# Patient Record
Sex: FEMALE | Race: WHITE | NOT HISPANIC OR LATINO | Employment: FULL TIME | URBAN - METROPOLITAN AREA
[De-identification: names, ages, dates, MRNs, and addresses within clinical notes are randomized per-mention and may not be internally consistent; named-entity substitution may affect disease eponyms.]

---

## 2017-01-10 LAB
HBV SURFACE AG SERPL QL IA: NEGATIVE
RUBELLA ANTIBODY IGG QUANTITATIVE: NORMAL IU/ML

## 2017-06-09 LAB — GROUP B STREP PCR: NEGATIVE

## 2017-07-16 ENCOUNTER — HOSPITAL ENCOUNTER (INPATIENT)
Facility: CLINIC | Age: 32
LOS: 4 days | Discharge: HOME | End: 2017-07-20
Attending: OBSTETRICS & GYNECOLOGY | Admitting: OBSTETRICS & GYNECOLOGY
Payer: COMMERCIAL

## 2017-07-16 DIAGNOSIS — Z98.891 S/P C-SECTION: Primary | ICD-10-CM

## 2017-07-16 PROCEDURE — 25000132 ZZH RX MED GY IP 250 OP 250 PS 637: Performed by: OBSTETRICS & GYNECOLOGY

## 2017-07-16 PROCEDURE — S0191 MISOPROSTOL, ORAL, 200 MCG: HCPCS | Performed by: OBSTETRICS & GYNECOLOGY

## 2017-07-16 PROCEDURE — 10907ZC DRAINAGE OF AMNIOTIC FLUID, THERAPEUTIC FROM PRODUCTS OF CONCEPTION, VIA NATURAL OR ARTIFICIAL OPENING: ICD-10-PCS | Performed by: REGISTERED NURSE

## 2017-07-16 PROCEDURE — 12000029 ZZH R&B OB INTERMEDIATE

## 2017-07-16 RX ORDER — MISOPROSTOL 100 UG/1
25 TABLET ORAL
Status: DISCONTINUED | OUTPATIENT
Start: 2017-07-16 | End: 2017-07-17

## 2017-07-16 RX ORDER — FENTANYL CITRATE 50 UG/ML
50-100 INJECTION, SOLUTION INTRAMUSCULAR; INTRAVENOUS
Status: DISCONTINUED | OUTPATIENT
Start: 2017-07-16 | End: 2017-07-17

## 2017-07-16 RX ORDER — ZOLPIDEM TARTRATE 5 MG/1
5 TABLET ORAL
Status: DISCONTINUED | OUTPATIENT
Start: 2017-07-16 | End: 2017-07-17

## 2017-07-16 RX ORDER — NALOXONE HYDROCHLORIDE 0.4 MG/ML
.1-.4 INJECTION, SOLUTION INTRAMUSCULAR; INTRAVENOUS; SUBCUTANEOUS
Status: DISCONTINUED | OUTPATIENT
Start: 2017-07-16 | End: 2017-07-17

## 2017-07-16 RX ADMIN — Medication 25 MCG: at 20:37

## 2017-07-16 RX ADMIN — Medication 25 MCG: at 22:31

## 2017-07-16 RX ADMIN — ZOLPIDEM TARTRATE 5 MG: 5 TABLET, FILM COATED ORAL at 22:31

## 2017-07-16 NOTE — IP AVS SNAPSHOT
MRN:[id number]                      After Visit Summary   2017    Leti Tapia    MRN: [id number]           Thank you!     Thank you for choosing [facility] for your care. Our goal is always to provide you with excellent care. Hearing back from our patients is one way we can continue to improve our services. Please take a few minutes to complete the written survey that you may receive in the mail after you visit with us. Thank you!        Patient Information     Date Of Birth          1985        About your hospital stay     You were admitted on:  2017 You last received care in the:  [address]    You were discharged on:  2017       Who to Call     For medical emergencies, please call 911.  For non-urgent questions about your medical care, please call your primary care provider or clinic, None  For questions related to your surgery, please call your surgery clinic        Attending Provider     Provider Specialty    Katy Redd MD OB/Gyn    Aislinn Layton APRN CNM [facility]    Janae Covarrubias MD OB/Gyn       Primary Care Provider    Physician No Ref-Primary      After Care Instructions     Activity       Review discharge instructions            Diet       Resume previous diet            Discharge Instructions - Postpartum visit       Schedule postpartum visit with your provider and return to clinic in 6 weeks.                  Further instructions from your care team       Postop  Birth Instructions    Activity       Do not lift more than 10 pounds for 6 weeks after surgery.  Ask family and friends for help when you need it.    No driving until you have stopped taking your pain medications (usually two weeks after surgery).    No heavy exercise or activity for 6 weeks.  Don't do anything that will put a strain on your surgery site.    Don't strain when using the toilet.  Your care team may  prescribe a stool softener if you have problems with your bowel movements.     To care for your incision:       Keep the incision clean and dry.    Do not soak your incision in water. No swimming or hot tubs until it has fully healed. You may soak in the bathtub if the water level is below your incision.    Do not use peroxide, gel, cream, lotion, or ointment on your incision.    Adjust your clothes to avoid pressure on your surgery site (check the elastic in your underwear for example).     You may see a small amount of clear or pink drainage and this is normal.  Check with your health care provider:       If the drainage increases or has an odor.    If the incision reddens, you have swelling, or develop a rash.    If you have increased pain and the medicine we prescribed doesn't help.    If you have a fever above 100.4 F (38 C) with or without chills when placing thermometer under your tongue.   The area around your incision (surgery wound), will feel numb.  This is normal. The numbness should go away in less than a year.     Keep your hands clean:  Always wash your hands before touching your incision (surgery wound). This helps reduce your risk of infection. If your hands aren't dirty, you may use an alcohol hand-rub to clean your hands. Keep your nails clean and short.    Call your healthcare provider if you have any of these symptoms:       You soak a sanitary pad with blood within 1 hour, or you see blood clots larger than a golf ball.    Bleeding that lasts more than 6 weeks.    Vaginal discharge that smells bad.    Severe pain, cramping or tenderness in your lower belly area.    A need to urinate more frequently (use the toilet more often), more urgently (use the toilet very quickly), or it burns when you urinate.    Nausea and vomiting.    Redness, swelling or pain around a vein in your leg.    Problems breastfeeding or a red or painful area on your breast.    Chest pain and cough or are gasping for  "air.    Problems with coping with sadness, anxiety or depression. If you have concerns about hurting yourself or the baby, call your provider immediately.      You have questions or concerns after you return home.                  Pending Results     No orders found from 2017 to 2017.            Statement of Approval     Ordered          17 0738  I have reviewed and agree with all the recommendations and orders detailed in this document.  EFFECTIVE NOW     Approved and electronically signed by:  Nohemy Augustine CNM             Admission Information     Date & Time Provider Department Dept. Phone    2017 Janae Covarrubias MD [address] [phone]      Your Vitals Were     Blood Pressure Pulse Temperature Respirations Height Weight    116/73 92 98.4  F (36.9  C) (Oral) 16 1.6 m (5' 3\") 74.8 kg (165 lb)    Pulse Oximetry BMI (Body Mass Index)                98% 29.23 kg/m2          [facility] Information     [employer] lets you send messages to your doctor, view your test results, renew your prescriptions, schedule appointments and more. To sign up, go to [email or web address] . Click on \"Log in\" on the left side of the screen, which will take you to the Welcome page. Then click on \"Sign up Now\" on the right side of the page.     You will be asked to enter the access code listed below, as well as some personal information. Please follow the directions to create your username and password.     Your access code is: [email or web address]  Expires: 10/18/2017  8:48 AM     Your access code will  in 90 days. If you need help or a new code, please call your [facility] clinic or [phone].        Care EveryWhere ID     This is your Care EveryWhere ID. This could be used by other organizations to access your [facility] medical records  [id number]        Equal Access to Services     CARLA REYES AH: Beth Chacon, connor hall, shannon sullivan " sari aguila brian renteria'aan ah. So [facility] [phone].    ATENCIÓN: Si tierrala britt, tiene a gee disposición servicios gratuitos de asistencia lingüística. Rosalee al [phone].    We comply with applicable federal civil rights laws and [place] laws. We do not discriminate on the basis of race, color, national origin, age, disability sex, sexual orientation or gender identity.               Review of your medicines      START taking        Dose / Directions    ibuprofen 600 MG tablet   Commonly known as:  ADVIL/MOTRIN        Dose:  600 mg   Take 1 tablet (600 mg) by mouth every 6 hours as needed for moderate pain   Quantity:  120 tablet   Refills:  1       oxyCODONE 5 MG IR tablet   Commonly known as:  ROXICODONE        Dose:  5-10 mg   Take 1-2 tablets (5-10 mg) by mouth every 4 hours as needed for moderate to severe pain   Quantity:  30 tablet   Refills:  0            Where to get your medicines      Some of these will need a paper prescription and others can be bought over the counter. Ask your nurse if you have questions.     Bring a paper prescription for each of these medications     ibuprofen 600 MG tablet    oxyCODONE 5 MG IR tablet                Protect others around you: Learn how to safely use, store and throw away your medicines at www.disposemymeds.org.             Medication List: This is a list of all your medications and when to take them. Check marks below indicate your daily home schedule. Keep this list as a reference.      Medications           Morning Afternoon Evening Bedtime As Needed    ibuprofen 600 MG tablet   Commonly known as:  ADVIL/MOTRIN   Take 1 tablet (600 mg) by mouth every 6 hours as needed for moderate pain   Last time this was given:  600 mg on 7/20/2017  7:18 AM                                oxyCODONE 5 MG IR tablet   Commonly known as:  ROXICODONE   Take 1-2 tablets (5-10 mg) by mouth every 4 hours as needed for moderate to severe pain   Last time this  was given:  5 mg on 7/20/2017  7:18 AM

## 2017-07-16 NOTE — IP AVS SNAPSHOT
[address]    [place] [address]    Phone:  [phone]                                       After Visit Summary   7/16/2017    Leti Tapia    MRN: [id number]           After Visit Summary Signature Page     I have received my discharge instructions, and my questions have been answered. I have discussed any challenges I see with this plan with the nurse or doctor.    ..........................................................................................................................................  Patient/Patient Representative Signature      ..........................................................................................................................................  Patient Representative Print Name and Relationship to Patient    ..................................................               ................................................  Date                                            Time    ..........................................................................................................................................  Reviewed by Signature/Title    ...................................................              ..............................................  Date                                                            Time

## 2017-07-17 ENCOUNTER — ANESTHESIA EVENT (OUTPATIENT)
Dept: OBGYN | Facility: CLINIC | Age: 32
End: 2017-07-17
Payer: COMMERCIAL

## 2017-07-17 ENCOUNTER — ANESTHESIA (OUTPATIENT)
Dept: OBGYN | Facility: CLINIC | Age: 32
End: 2017-07-17
Payer: COMMERCIAL

## 2017-07-17 PROBLEM — Z98.891 S/P C-SECTION: Status: ACTIVE | Noted: 2017-07-17

## 2017-07-17 LAB
ABO + RH BLD: NORMAL
BLD GP AB SCN SERPL QL: NORMAL
BLD GP AB SCN SERPL QL: NORMAL
BLOOD BANK CMNT PATIENT-IMP: NORMAL
HGB BLD-MCNC: 11.5 G/DL (ref 11.7–15.7)
SPECIMEN EXP DATE BLD: NORMAL

## 2017-07-17 PROCEDURE — 86900 BLOOD TYPING SEROLOGIC ABO: CPT | Performed by: REGISTERED NURSE

## 2017-07-17 PROCEDURE — 25000128 H RX IP 250 OP 636: Performed by: NURSE ANESTHETIST, CERTIFIED REGISTERED

## 2017-07-17 PROCEDURE — 86901 BLOOD TYPING SEROLOGIC RH(D): CPT | Performed by: REGISTERED NURSE

## 2017-07-17 PROCEDURE — 25000125 ZZHC RX 250: Performed by: NURSE ANESTHETIST, CERTIFIED REGISTERED

## 2017-07-17 PROCEDURE — 25000128 H RX IP 250 OP 636: Performed by: ANESTHESIOLOGY

## 2017-07-17 PROCEDURE — 37000011 ZZH ANESTHESIA WARD SERVICE

## 2017-07-17 PROCEDURE — 37000008 ZZH ANESTHESIA TECHNICAL FEE, 1ST 30 MIN: Performed by: OBSTETRICS & GYNECOLOGY

## 2017-07-17 PROCEDURE — C1765 ADHESION BARRIER: HCPCS | Performed by: OBSTETRICS & GYNECOLOGY

## 2017-07-17 PROCEDURE — 36000056 ZZH SURGERY LEVEL 3 1ST 30 MIN: Performed by: OBSTETRICS & GYNECOLOGY

## 2017-07-17 PROCEDURE — 37000009 ZZH ANESTHESIA TECHNICAL FEE, EACH ADDTL 15 MIN: Performed by: OBSTETRICS & GYNECOLOGY

## 2017-07-17 PROCEDURE — 36415 COLL VENOUS BLD VENIPUNCTURE: CPT | Performed by: OBSTETRICS & GYNECOLOGY

## 2017-07-17 PROCEDURE — 86900 BLOOD TYPING SEROLOGIC ABO: CPT | Performed by: OBSTETRICS & GYNECOLOGY

## 2017-07-17 PROCEDURE — 86901 BLOOD TYPING SEROLOGIC RH(D): CPT | Performed by: OBSTETRICS & GYNECOLOGY

## 2017-07-17 PROCEDURE — 12000029 ZZH R&B OB INTERMEDIATE

## 2017-07-17 PROCEDURE — 25000128 H RX IP 250 OP 636: Performed by: REGISTERED NURSE

## 2017-07-17 PROCEDURE — 25000134 H RX MED IP 250 OP 636 PS 250: Performed by: NURSE ANESTHETIST, CERTIFIED REGISTERED

## 2017-07-17 PROCEDURE — 85018 HEMOGLOBIN: CPT | Performed by: REGISTERED NURSE

## 2017-07-17 PROCEDURE — 27210794 ZZH OR GENERAL SUPPLY STERILE: Performed by: OBSTETRICS & GYNECOLOGY

## 2017-07-17 PROCEDURE — 86780 TREPONEMA PALLIDUM: CPT | Performed by: OBSTETRICS & GYNECOLOGY

## 2017-07-17 PROCEDURE — 25000132 ZZH RX MED GY IP 250 OP 250 PS 637: Performed by: OBSTETRICS & GYNECOLOGY

## 2017-07-17 PROCEDURE — 86850 RBC ANTIBODY SCREEN: CPT | Performed by: OBSTETRICS & GYNECOLOGY

## 2017-07-17 PROCEDURE — 36000058 ZZH SURGERY LEVEL 3 EA 15 ADDTL MIN: Performed by: OBSTETRICS & GYNECOLOGY

## 2017-07-17 PROCEDURE — 00HU33Z INSERTION OF INFUSION DEVICE INTO SPINAL CANAL, PERCUTANEOUS APPROACH: ICD-10-PCS | Performed by: ANESTHESIOLOGY

## 2017-07-17 PROCEDURE — 3E0R3CZ INTRODUCTION OF REGIONAL ANESTHETIC INTO SPINAL CANAL, PERCUTANEOUS APPROACH: ICD-10-PCS | Performed by: ANESTHESIOLOGY

## 2017-07-17 PROCEDURE — 25000128 H RX IP 250 OP 636: Performed by: OBSTETRICS & GYNECOLOGY

## 2017-07-17 PROCEDURE — 86850 RBC ANTIBODY SCREEN: CPT | Performed by: REGISTERED NURSE

## 2017-07-17 PROCEDURE — 71000012 ZZH RECOVERY PHASE 1 LEVEL 1 FIRST HR: Performed by: OBSTETRICS & GYNECOLOGY

## 2017-07-17 PROCEDURE — 25000125 ZZHC RX 250: Performed by: REGISTERED NURSE

## 2017-07-17 PROCEDURE — S0191 MISOPROSTOL, ORAL, 200 MCG: HCPCS | Performed by: OBSTETRICS & GYNECOLOGY

## 2017-07-17 PROCEDURE — 25000125 ZZHC RX 250: Performed by: OBSTETRICS & GYNECOLOGY

## 2017-07-17 PROCEDURE — 25000132 ZZH RX MED GY IP 250 OP 250 PS 637: Performed by: REGISTERED NURSE

## 2017-07-17 RX ORDER — ONDANSETRON 2 MG/ML
4 INJECTION INTRAMUSCULAR; INTRAVENOUS EVERY 30 MIN PRN
Status: DISCONTINUED | OUTPATIENT
Start: 2017-07-17 | End: 2017-07-18 | Stop reason: HOSPADM

## 2017-07-17 RX ORDER — ACETAMINOPHEN 325 MG/1
650 TABLET ORAL EVERY 4 HOURS PRN
Status: DISCONTINUED | OUTPATIENT
Start: 2017-07-17 | End: 2017-07-17

## 2017-07-17 RX ORDER — NALOXONE HYDROCHLORIDE 0.4 MG/ML
.1-.4 INJECTION, SOLUTION INTRAMUSCULAR; INTRAVENOUS; SUBCUTANEOUS
Status: DISCONTINUED | OUTPATIENT
Start: 2017-07-17 | End: 2017-07-17

## 2017-07-17 RX ORDER — OXYTOCIN/0.9 % SODIUM CHLORIDE 30/500 ML
1-24 PLASTIC BAG, INJECTION (ML) INTRAVENOUS CONTINUOUS
Status: DISCONTINUED | OUTPATIENT
Start: 2017-07-17 | End: 2017-07-17

## 2017-07-17 RX ORDER — NALOXONE HYDROCHLORIDE 0.4 MG/ML
.1-.4 INJECTION, SOLUTION INTRAMUSCULAR; INTRAVENOUS; SUBCUTANEOUS
Status: DISCONTINUED | OUTPATIENT
Start: 2017-07-17 | End: 2017-07-18 | Stop reason: HOSPADM

## 2017-07-17 RX ORDER — EPHEDRINE SULFATE 50 MG/ML
5 INJECTION, SOLUTION INTRAMUSCULAR; INTRAVENOUS; SUBCUTANEOUS
Status: DISCONTINUED | OUTPATIENT
Start: 2017-07-17 | End: 2017-07-17

## 2017-07-17 RX ORDER — ONDANSETRON 2 MG/ML
4 INJECTION INTRAMUSCULAR; INTRAVENOUS EVERY 6 HOURS PRN
Status: DISCONTINUED | OUTPATIENT
Start: 2017-07-17 | End: 2017-07-17

## 2017-07-17 RX ORDER — ACETAMINOPHEN 325 MG/1
975 TABLET ORAL EVERY 8 HOURS
Status: DISCONTINUED | OUTPATIENT
Start: 2017-07-17 | End: 2017-07-20 | Stop reason: HOSPADM

## 2017-07-17 RX ORDER — SIMETHICONE 80 MG
80 TABLET,CHEWABLE ORAL 4 TIMES DAILY PRN
Status: DISCONTINUED | OUTPATIENT
Start: 2017-07-17 | End: 2017-07-20 | Stop reason: HOSPADM

## 2017-07-17 RX ORDER — ROPIVACAINE HYDROCHLORIDE 2 MG/ML
10 INJECTION, SOLUTION EPIDURAL; INFILTRATION; PERINEURAL ONCE
Status: COMPLETED | OUTPATIENT
Start: 2017-07-17 | End: 2017-07-17

## 2017-07-17 RX ORDER — CITRIC ACID/SODIUM CITRATE 334-500MG
30 SOLUTION, ORAL ORAL
Status: COMPLETED | OUTPATIENT
Start: 2017-07-17 | End: 2017-07-17

## 2017-07-17 RX ORDER — HYDROMORPHONE HYDROCHLORIDE 1 MG/ML
.3-.5 INJECTION, SOLUTION INTRAMUSCULAR; INTRAVENOUS; SUBCUTANEOUS EVERY 30 MIN PRN
Status: DISCONTINUED | OUTPATIENT
Start: 2017-07-17 | End: 2017-07-19 | Stop reason: CLARIF

## 2017-07-17 RX ORDER — FENTANYL CITRATE 50 UG/ML
INJECTION, SOLUTION INTRAMUSCULAR; INTRAVENOUS PRN
Status: DISCONTINUED | OUTPATIENT
Start: 2017-07-17 | End: 2017-07-17

## 2017-07-17 RX ORDER — BISACODYL 10 MG
10 SUPPOSITORY, RECTAL RECTAL DAILY PRN
Status: DISCONTINUED | OUTPATIENT
Start: 2017-07-19 | End: 2017-07-20 | Stop reason: HOSPADM

## 2017-07-17 RX ORDER — ONDANSETRON 4 MG/1
4 TABLET, ORALLY DISINTEGRATING ORAL EVERY 6 HOURS PRN
Status: DISCONTINUED | OUTPATIENT
Start: 2017-07-17 | End: 2017-07-17

## 2017-07-17 RX ORDER — CHLOROPROCAINE HYDROCHLORIDE 30 MG/ML
INJECTION, SOLUTION EPIDURAL; INFILTRATION; INTRACAUDAL; PERINEURAL PRN
Status: DISCONTINUED | OUTPATIENT
Start: 2017-07-17 | End: 2017-07-17

## 2017-07-17 RX ORDER — NALOXONE HYDROCHLORIDE 0.4 MG/ML
.1-.4 INJECTION, SOLUTION INTRAMUSCULAR; INTRAVENOUS; SUBCUTANEOUS
Status: DISCONTINUED | OUTPATIENT
Start: 2017-07-17 | End: 2017-07-19 | Stop reason: CLARIF

## 2017-07-17 RX ORDER — MORPHINE SULFATE 1 MG/ML
INJECTION, SOLUTION EPIDURAL; INTRATHECAL; INTRAVENOUS
Status: DISCONTINUED
Start: 2017-07-17 | End: 2017-07-17 | Stop reason: HOSPADM

## 2017-07-17 RX ORDER — OXYTOCIN 10 [USP'U]/ML
10 INJECTION, SOLUTION INTRAMUSCULAR; INTRAVENOUS
Status: DISCONTINUED | OUTPATIENT
Start: 2017-07-17 | End: 2017-07-20 | Stop reason: HOSPADM

## 2017-07-17 RX ORDER — CEFAZOLIN SODIUM 1 G/3ML
1 INJECTION, POWDER, FOR SOLUTION INTRAMUSCULAR; INTRAVENOUS
Status: DISCONTINUED | OUTPATIENT
Start: 2017-07-17 | End: 2017-07-17

## 2017-07-17 RX ORDER — CEFAZOLIN SODIUM 2 G/100ML
2 INJECTION, SOLUTION INTRAVENOUS
Status: COMPLETED | OUTPATIENT
Start: 2017-07-17 | End: 2017-07-17

## 2017-07-17 RX ORDER — LIDOCAINE 40 MG/G
CREAM TOPICAL
Status: DISCONTINUED | OUTPATIENT
Start: 2017-07-17 | End: 2017-07-17

## 2017-07-17 RX ORDER — IBUPROFEN 800 MG/1
800 TABLET, FILM COATED ORAL
Status: DISCONTINUED | OUTPATIENT
Start: 2017-07-17 | End: 2017-07-17

## 2017-07-17 RX ORDER — LIDOCAINE 40 MG/G
CREAM TOPICAL
Status: DISCONTINUED | OUTPATIENT
Start: 2017-07-17 | End: 2017-07-18 | Stop reason: HOSPADM

## 2017-07-17 RX ORDER — KETOROLAC TROMETHAMINE 30 MG/ML
30 INJECTION, SOLUTION INTRAMUSCULAR; INTRAVENOUS EVERY 6 HOURS
Status: DISCONTINUED | OUTPATIENT
Start: 2017-07-17 | End: 2017-07-17

## 2017-07-17 RX ORDER — DIPHENHYDRAMINE HYDROCHLORIDE 50 MG/ML
25 INJECTION INTRAMUSCULAR; INTRAVENOUS EVERY 6 HOURS PRN
Status: DISCONTINUED | OUTPATIENT
Start: 2017-07-17 | End: 2017-07-19 | Stop reason: CLARIF

## 2017-07-17 RX ORDER — AMOXICILLIN 250 MG
1-2 CAPSULE ORAL 2 TIMES DAILY
Status: DISCONTINUED | OUTPATIENT
Start: 2017-07-17 | End: 2017-07-20 | Stop reason: HOSPADM

## 2017-07-17 RX ORDER — SODIUM CHLORIDE, SODIUM LACTATE, POTASSIUM CHLORIDE, CALCIUM CHLORIDE 600; 310; 30; 20 MG/100ML; MG/100ML; MG/100ML; MG/100ML
INJECTION, SOLUTION INTRAVENOUS CONTINUOUS
Status: DISCONTINUED | OUTPATIENT
Start: 2017-07-17 | End: 2017-07-17

## 2017-07-17 RX ORDER — HYDROCORTISONE 2.5 %
CREAM (GRAM) TOPICAL 3 TIMES DAILY PRN
Status: DISCONTINUED | OUTPATIENT
Start: 2017-07-17 | End: 2017-07-20 | Stop reason: HOSPADM

## 2017-07-17 RX ORDER — MORPHINE SULFATE 1 MG/ML
INJECTION, SOLUTION EPIDURAL; INTRATHECAL; INTRAVENOUS PRN
Status: DISCONTINUED | OUTPATIENT
Start: 2017-07-17 | End: 2017-07-17

## 2017-07-17 RX ORDER — SODIUM CHLORIDE, SODIUM LACTATE, POTASSIUM CHLORIDE, CALCIUM CHLORIDE 600; 310; 30; 20 MG/100ML; MG/100ML; MG/100ML; MG/100ML
INJECTION, SOLUTION INTRAVENOUS CONTINUOUS
Status: DISCONTINUED | OUTPATIENT
Start: 2017-07-17 | End: 2017-07-18 | Stop reason: HOSPADM

## 2017-07-17 RX ORDER — OXYTOCIN/0.9 % SODIUM CHLORIDE 30/500 ML
PLASTIC BAG, INJECTION (ML) INTRAVENOUS
Status: DISCONTINUED
Start: 2017-07-17 | End: 2017-07-17 | Stop reason: HOSPADM

## 2017-07-17 RX ORDER — LIDOCAINE 40 MG/G
CREAM TOPICAL
Status: DISCONTINUED | OUTPATIENT
Start: 2017-07-17 | End: 2017-07-20 | Stop reason: HOSPADM

## 2017-07-17 RX ORDER — OXYCODONE HYDROCHLORIDE 5 MG/1
5-10 TABLET ORAL
Status: DISCONTINUED | OUTPATIENT
Start: 2017-07-17 | End: 2017-07-20 | Stop reason: HOSPADM

## 2017-07-17 RX ORDER — OXYTOCIN/0.9 % SODIUM CHLORIDE 30/500 ML
340 PLASTIC BAG, INJECTION (ML) INTRAVENOUS CONTINUOUS PRN
Status: DISCONTINUED | OUTPATIENT
Start: 2017-07-17 | End: 2017-07-20 | Stop reason: HOSPADM

## 2017-07-17 RX ORDER — EPHEDRINE SULFATE 50 MG/ML
5 INJECTION, SOLUTION INTRAMUSCULAR; INTRAVENOUS; SUBCUTANEOUS
Status: DISCONTINUED | OUTPATIENT
Start: 2017-07-17 | End: 2017-07-18 | Stop reason: HOSPADM

## 2017-07-17 RX ORDER — LANOLIN 100 %
OINTMENT (GRAM) TOPICAL
Status: DISCONTINUED | OUTPATIENT
Start: 2017-07-17 | End: 2017-07-20 | Stop reason: HOSPADM

## 2017-07-17 RX ORDER — OXYCODONE AND ACETAMINOPHEN 5; 325 MG/1; MG/1
1 TABLET ORAL
Status: DISCONTINUED | OUTPATIENT
Start: 2017-07-17 | End: 2017-07-17

## 2017-07-17 RX ORDER — OXYTOCIN 10 [USP'U]/ML
10 INJECTION, SOLUTION INTRAMUSCULAR; INTRAVENOUS
Status: DISCONTINUED | OUTPATIENT
Start: 2017-07-17 | End: 2017-07-17

## 2017-07-17 RX ORDER — MORPHINE SULFATE 1 MG/ML
3 INJECTION, SOLUTION EPIDURAL; INTRATHECAL; INTRAVENOUS ONCE
Status: DISCONTINUED | OUTPATIENT
Start: 2017-07-17 | End: 2017-07-18 | Stop reason: HOSPADM

## 2017-07-17 RX ORDER — METHYLERGONOVINE MALEATE 0.2 MG/ML
200 INJECTION INTRAVENOUS
Status: DISCONTINUED | OUTPATIENT
Start: 2017-07-17 | End: 2017-07-17

## 2017-07-17 RX ORDER — NALBUPHINE HYDROCHLORIDE 10 MG/ML
2.5-5 INJECTION, SOLUTION INTRAMUSCULAR; INTRAVENOUS; SUBCUTANEOUS EVERY 6 HOURS PRN
Status: DISCONTINUED | OUTPATIENT
Start: 2017-07-17 | End: 2017-07-18 | Stop reason: HOSPADM

## 2017-07-17 RX ORDER — OXYTOCIN/0.9 % SODIUM CHLORIDE 30/500 ML
100 PLASTIC BAG, INJECTION (ML) INTRAVENOUS CONTINUOUS
Status: DISCONTINUED | OUTPATIENT
Start: 2017-07-17 | End: 2017-07-18 | Stop reason: CLARIF

## 2017-07-17 RX ORDER — FENTANYL CITRATE 50 UG/ML
INJECTION, SOLUTION INTRAMUSCULAR; INTRAVENOUS
Status: DISCONTINUED
Start: 2017-07-17 | End: 2017-07-17 | Stop reason: HOSPADM

## 2017-07-17 RX ORDER — OXYTOCIN/0.9 % SODIUM CHLORIDE 30/500 ML
100-340 PLASTIC BAG, INJECTION (ML) INTRAVENOUS CONTINUOUS PRN
Status: COMPLETED | OUTPATIENT
Start: 2017-07-17 | End: 2017-07-17

## 2017-07-17 RX ORDER — NALBUPHINE HYDROCHLORIDE 10 MG/ML
2.5-5 INJECTION, SOLUTION INTRAMUSCULAR; INTRAVENOUS; SUBCUTANEOUS EVERY 6 HOURS PRN
Status: DISCONTINUED | OUTPATIENT
Start: 2017-07-17 | End: 2017-07-17

## 2017-07-17 RX ORDER — CARBOPROST TROMETHAMINE 250 UG/ML
250 INJECTION, SOLUTION INTRAMUSCULAR
Status: DISCONTINUED | OUTPATIENT
Start: 2017-07-17 | End: 2017-07-17

## 2017-07-17 RX ORDER — DEXTROSE, SODIUM CHLORIDE, SODIUM LACTATE, POTASSIUM CHLORIDE, AND CALCIUM CHLORIDE 5; .6; .31; .03; .02 G/100ML; G/100ML; G/100ML; G/100ML; G/100ML
INJECTION, SOLUTION INTRAVENOUS CONTINUOUS
Status: DISCONTINUED | OUTPATIENT
Start: 2017-07-17 | End: 2017-07-18 | Stop reason: CLARIF

## 2017-07-17 RX ORDER — MISOPROSTOL 200 UG/1
400 TABLET ORAL
Status: DISCONTINUED | OUTPATIENT
Start: 2017-07-17 | End: 2017-07-20 | Stop reason: HOSPADM

## 2017-07-17 RX ORDER — CHLOROPROCAINE HYDROCHLORIDE 30 MG/ML
INJECTION, SOLUTION EPIDURAL; INFILTRATION; INTRACAUDAL; PERINEURAL
Status: DISCONTINUED
Start: 2017-07-17 | End: 2017-07-17 | Stop reason: HOSPADM

## 2017-07-17 RX ORDER — ONDANSETRON 2 MG/ML
4 INJECTION INTRAMUSCULAR; INTRAVENOUS EVERY 6 HOURS PRN
Status: DISCONTINUED | OUTPATIENT
Start: 2017-07-17 | End: 2017-07-20 | Stop reason: HOSPADM

## 2017-07-17 RX ORDER — DIPHENHYDRAMINE HCL 25 MG
25 CAPSULE ORAL EVERY 6 HOURS PRN
Status: DISCONTINUED | OUTPATIENT
Start: 2017-07-17 | End: 2017-07-20 | Stop reason: HOSPADM

## 2017-07-17 RX ORDER — FENTANYL CITRATE 50 UG/ML
25-50 INJECTION, SOLUTION INTRAMUSCULAR; INTRAVENOUS
Status: DISCONTINUED | OUTPATIENT
Start: 2017-07-17 | End: 2017-07-18 | Stop reason: HOSPADM

## 2017-07-17 RX ORDER — ONDANSETRON 4 MG/1
4 TABLET, ORALLY DISINTEGRATING ORAL EVERY 30 MIN PRN
Status: DISCONTINUED | OUTPATIENT
Start: 2017-07-17 | End: 2017-07-18 | Stop reason: HOSPADM

## 2017-07-17 RX ORDER — KETOROLAC TROMETHAMINE 30 MG/ML
30 INJECTION, SOLUTION INTRAMUSCULAR; INTRAVENOUS EVERY 6 HOURS
Status: COMPLETED | OUTPATIENT
Start: 2017-07-18 | End: 2017-07-19

## 2017-07-17 RX ADMIN — PHENYLEPHRINE HYDROCHLORIDE 100 MCG: 10 INJECTION, SOLUTION INTRAMUSCULAR; INTRAVENOUS; SUBCUTANEOUS at 22:51

## 2017-07-17 RX ADMIN — PHENYLEPHRINE HYDROCHLORIDE 100 MCG: 10 INJECTION, SOLUTION INTRAMUSCULAR; INTRAVENOUS; SUBCUTANEOUS at 22:20

## 2017-07-17 RX ADMIN — CHLOROPROCAINE HYDROCHLORIDE 30 ML: 30 INJECTION, SOLUTION EPIDURAL; INFILTRATION at 22:12

## 2017-07-17 RX ADMIN — PHENYLEPHRINE HYDROCHLORIDE 100 MCG: 10 INJECTION, SOLUTION INTRAMUSCULAR; INTRAVENOUS; SUBCUTANEOUS at 22:22

## 2017-07-17 RX ADMIN — SODIUM CHLORIDE, POTASSIUM CHLORIDE, SODIUM LACTATE AND CALCIUM CHLORIDE: 600; 310; 30; 20 INJECTION, SOLUTION INTRAVENOUS at 22:19

## 2017-07-17 RX ADMIN — SODIUM CHLORIDE, POTASSIUM CHLORIDE, SODIUM LACTATE AND CALCIUM CHLORIDE: 600; 310; 30; 20 INJECTION, SOLUTION INTRAVENOUS at 12:40

## 2017-07-17 RX ADMIN — CEFAZOLIN SODIUM 2 G: 2 INJECTION, SOLUTION INTRAVENOUS at 22:20

## 2017-07-17 RX ADMIN — FENTANYL CITRATE 100 MCG: 50 INJECTION, SOLUTION INTRAMUSCULAR; INTRAVENOUS at 11:47

## 2017-07-17 RX ADMIN — Medication 12 ML/HR: at 13:22

## 2017-07-17 RX ADMIN — SODIUM CHLORIDE, POTASSIUM CHLORIDE, SODIUM LACTATE AND CALCIUM CHLORIDE: 600; 310; 30; 20 INJECTION, SOLUTION INTRAVENOUS at 20:27

## 2017-07-17 RX ADMIN — ROPIVACAINE HYDROCHLORIDE 10 ML: 2 INJECTION, SOLUTION EPIDURAL; INFILTRATION at 13:08

## 2017-07-17 RX ADMIN — SODIUM CHLORIDE, POTASSIUM CHLORIDE, SODIUM LACTATE AND CALCIUM CHLORIDE: 600; 310; 30; 20 INJECTION, SOLUTION INTRAVENOUS at 14:11

## 2017-07-17 RX ADMIN — PHENYLEPHRINE HYDROCHLORIDE 200 MCG: 10 INJECTION, SOLUTION INTRAMUSCULAR; INTRAVENOUS; SUBCUTANEOUS at 22:36

## 2017-07-17 RX ADMIN — Medication 25 MCG: at 04:40

## 2017-07-17 RX ADMIN — Medication 25 MCG: at 02:48

## 2017-07-17 RX ADMIN — Medication 340 ML/HR: at 22:35

## 2017-07-17 RX ADMIN — Medication 1 MILLI-UNITS/MIN: at 16:16

## 2017-07-17 RX ADMIN — PHENYLEPHRINE HYDROCHLORIDE 200 MCG: 10 INJECTION, SOLUTION INTRAMUSCULAR; INTRAVENOUS; SUBCUTANEOUS at 22:56

## 2017-07-17 RX ADMIN — PHENYLEPHRINE HYDROCHLORIDE 100 MCG: 10 INJECTION, SOLUTION INTRAMUSCULAR; INTRAVENOUS; SUBCUTANEOUS at 22:17

## 2017-07-17 RX ADMIN — FENTANYL CITRATE 50 MCG: 50 INJECTION, SOLUTION INTRAMUSCULAR; INTRAVENOUS at 23:11

## 2017-07-17 RX ADMIN — FENTANYL CITRATE 50 MCG: 50 INJECTION, SOLUTION INTRAMUSCULAR; INTRAVENOUS at 23:09

## 2017-07-17 RX ADMIN — SODIUM CHLORIDE, POTASSIUM CHLORIDE, SODIUM LACTATE AND CALCIUM CHLORIDE: 600; 310; 30; 20 INJECTION, SOLUTION INTRAVENOUS at 11:43

## 2017-07-17 RX ADMIN — MORPHINE SULFATE 3 MG: 1 INJECTION EPIDURAL; INTRATHECAL; INTRAVENOUS at 22:12

## 2017-07-17 RX ADMIN — SODIUM CITRATE AND CITRIC ACID MONOHYDRATE 30 ML: 500; 334 SOLUTION ORAL at 21:56

## 2017-07-17 RX ADMIN — PHENYLEPHRINE HYDROCHLORIDE 100 MCG: 10 INJECTION, SOLUTION INTRAMUSCULAR; INTRAVENOUS; SUBCUTANEOUS at 22:45

## 2017-07-17 RX ADMIN — Medication 25 MCG: at 00:32

## 2017-07-17 RX ADMIN — PHENYLEPHRINE HYDROCHLORIDE 100 MCG: 10 INJECTION, SOLUTION INTRAMUSCULAR; INTRAVENOUS; SUBCUTANEOUS at 22:13

## 2017-07-17 RX ADMIN — PHENYLEPHRINE HYDROCHLORIDE 100 MCG: 10 INJECTION, SOLUTION INTRAMUSCULAR; INTRAVENOUS; SUBCUTANEOUS at 23:04

## 2017-07-17 RX ADMIN — PHENYLEPHRINE HYDROCHLORIDE 100 MCG: 10 INJECTION, SOLUTION INTRAMUSCULAR; INTRAVENOUS; SUBCUTANEOUS at 22:27

## 2017-07-17 ASSESSMENT — LIFESTYLE VARIABLES: TOBACCO_USE: 1

## 2017-07-17 NOTE — ANESTHESIA PREPROCEDURE EVALUATION
Anesthesia Plan      History & Physical Review      ASA Status:  2 .             Postoperative Care      Consents                          .

## 2017-07-17 NOTE — H&P
"[facility] Labor and Delivery History and Physical    Leti Tapia MRN# [id number]   Age: 32 year old YOB: 1985     Date of Admission:  2017    Primary care provider: No Ref-Primary, Physician           Chief Complaint:   Leti Tapia is a 32 year old  who is 41w2d pregnant and was admitted yesterday evening for IOL for postdates. Oral cytotec x 5 doses was administered over the last 12 hours. The last dose was at 0400.           Pregnancy history:     OBSTETRIC HISTORY:  CURRENT PREGNANCY: Pt was in [place] the month she conceived. Zika screening was negative. Otherwise normal prenatal course. EFW: 7#12oz. GBS negative. Rh pos.     Obstetric History       T0      L0     SAB0   TAB0   Ectopic0   Multiple0   Live Births0       # Outcome Date GA Lbr Jovany/2nd Weight Sex Delivery Anes PTL Lv   1 Current                   EDC: Estimated Date of Delivery: 2017    Prenatal Labs:   Lab Results   Component Value Date    ABO O 10/20/2014    RH  Pos 10/20/2014    AS Neg 10/20/2014    HEPBANG negative 01/10/2017    HGB 13.4 10/20/2014       GBS Status:   Lab Results   Component Value Date    GBS negative 2017       Active Problem List  Patient Active Problem List   Diagnosis     Vulvar hematoma     Post-op pain     Allergic rhinitis, unspecified allergic rhinitis type     Indication for care in labor or delivery       Medication Prior to Admission  No prescriptions prior to admission.   .        Maternal Past Medical History:   History reviewed. No pertinent past medical history.                       Social History:   I have reviewed this patient's social history          Physical Exam:   Vitals were reviewed  Blood pressure 121/75, pulse 83, temperature 98.7  F (37.1  C), temperature source Temporal, resp. rate 16, height 1.6 m (5' 3\"), weight 74.8 kg (165 lb), not currently breastfeeding.  Constitutional:   awake, alert, cooperative, no apparent " distress, and appears stated age      Cervix:   Membranes: intact   Dilation: 1   Effacement: 80%   Station:-2   Consistency: soft   Position: Anterior  Presentation:Cephalic  Fetal Heart Rate Tracing: Category I  Tocometer: frequency q 1-3 minutes                       Assessment:   Leti Tapia is a 41w2d pregnant female admitted with induction of labor, indication post-dates.          Plan:   Intermittent monitoring for now. Pt encouraged to ambulate or get on ball. If same ctx pattern with no change by 1000, will AROM. If ctxs space out, will start Pitocin.     BLAISE BrumfieldM

## 2017-07-17 NOTE — PLAN OF CARE
1930-pt arrived on unit, ambulatory with . Transferred to room 213. Oriented pt to room and call light. Applied monitors. VSS. Discussed plan of care with pt and .   1950-notified Dr Redd, orders received.  2020- piv placed  2030-SVE 1/60/-3. Oral cytotec given

## 2017-07-17 NOTE — ANESTHESIA PROCEDURE NOTES
Peripheral nerve/Neuraxial procedure note : epidural catheter  Pre-Procedure  Performed by HIPOLITO ROSALES  Referred by FLESCHER NMW  Location: OB      Pre-Anesthestic Checklist: patient identified, IV checked, risks and benefits discussed, informed consent, monitors and equipment checked, pre-op evaluation and at physician/surgeon's request    Timeout  Correct Patient: Yes   Correct Procedure: Yes   Correct Site: Yes   Correct Laterality: N/A   Correct Position: Yes   Site Marked: N/A   .   Procedure Documentation    .    Procedure:    Epidural catheter.  Insertion Site:L3-4  (midline approach) Injection technique: LORT saline   Local skin infiltrated with mL of 1% lidocaine.  EMIGDIO at 3.5 cm     Patient Prep;mask, sterile gloves, povidone-iodine 7.5% surgical scrub, patient draped.  .  Needle: Touhy needle Needle Gauge: 17.    Needle Length (Inches) 3.5  # of attempts: 1 and # of redirects:  .   . .  Catheter threaded easily  3 cm epidural space.  .   .    Assessment/Narrative  Paresthesias: No.  .  .  Aspiration negative for heme or CSF  . Test dose of 3 mL lidocaine 1.5% w/ 1:200,000 epinephrine at. Test dose negative for signs of intravascular, subdural or intrathecal injection. Comments:  No complications. Sterile Dressing.

## 2017-07-17 NOTE — PLAN OF CARE
0730:  Report taken from Moy PECK. Resumed all cares. Patient sitting in bed, eating small breakfast, nurse repositioned monitors. Patient states she is feeling contractions, mild, cramping.     0758: Aislinn Layton CNM at bedside for SVE and status update. Intermittent monitoring ordered by midwife.     0805: Pt up to birthing ball/to walk.     0905: Pt back to bed, monitors reapplied.     0925: Pt returned to birthing ball.    1135: pt requesting pain medicine, SVE by RN , pain control options discussed with  Patient. Patient opts for IV fentanyl.    1210: CNM checked on pt and to do SVE. Patient requesting epidural. LR bolus started.     1250: MDA paged for labor epidural.    1300: Dr. Lundy at bedside for epidural. Ropivicain given to MDA by IRIS Lawton RN. Time Out and consent for procedure done.     1351: CNM at bedside to complete SVE.    1400: Laar placed, rt repositioned and settled for rest.

## 2017-07-17 NOTE — PROGRESS NOTES
"OB Progress Note  2017  6:15 PM    S:  Pt comfortable with epidural.  No other complaints.      O:  /55  Pulse 83  Temp 98.1  F (36.7  C)  Resp 16  Ht 1.6 m (5' 3\")  Wt 74.8 kg (165 lb)  SpO2 97%  BMI 29.23 kg/m2  EFM: Baseline 120bpm, occasional variables and early decelerations, + accels, mod variability, Category II.   [place]:  Ctx q1-4 min  SVE:  3.5/100%/-2  Membranes: clear fluid    Pitocin at 3 mU/min    A/P:  32 year old  @41w2d admitted IOL for postdates  1.  Routine cares  2.  Early phase of first stage of labor.   3.  Continue labor. Will reevaluate in 2 hrs.     Aislinn Layton    "

## 2017-07-17 NOTE — PROGRESS NOTES
"OB Progress Note  2017  1:57 PM    S:  Pt comfortable with epidural.  No other complaints.      O:  /63  Pulse 83  Temp 98.2  F (36.8  C) (Temporal)  Resp 16  Ht 1.6 m (5' 3\")  Wt 74.8 kg (165 lb)  SpO2 98%  BMI 29.23 kg/m2  EFM: Category 1  [place]:  Ctx q1.5-4.5 min  SVE:  3/100%/-2  Membranes: clear fluid        A/P:  32 year old  @41w2d admitted for IOL for postdates  1.  Routine cares  2.  Will recheck cervix at 1600. If no change, will start Pitocin. Will start Pitocin sooner if ctxs lessen in frequency.   3.  Anticipate     Aislinn Layton    "

## 2017-07-17 NOTE — PLAN OF CARE
1450 Care assumed.  Pt is resting comfortably in bed.  Pt repositioned and will try to continue resting.    1600 PC from AIDAN Layton CNM, updated on FHR, occ variable/early, uc's  Spacing out, pt comfortable with epidural.  Will plan to do SVE now.  order rec'd to start pitocin to augment labor if minimal/no cx change.    1610 POC discussed with pt.  Pt agrees with plan.  SVe by RN.  Minimal cx change.  Pitocin started to augment labor.      1800 Aislinn Layton CNM at bedside. Updated on early and occ variable decel. Strip rev'd by   CNM. SVE by CNM, POC discussed with pt. Pt repositioned.    1840  Aislinn Layton in to see pt, updated RN she spoke with Dr. Covarrubias and plan is to increase pitocin 1/2 hr after variable if no further variables.    1905 FHR early dec but no variables.  Pitocin increased by 1mU.  Will continue to monitor.    1915 Report given to CHAY Soria RN to assume care.

## 2017-07-17 NOTE — PROGRESS NOTES
"OB Progress Note  2017  12:15 PM    S:  Pt coping with pain with fentanyl. Breathing through ctxs.  No other complaints.  I performed AROM at 1000. Clear fluid. Fetus tolerated procedure well.       O:  /75  Pulse 83  Temp 98.2  F (36.8  C) (Temporal)  Resp (P) 16  Ht 1.6 m (5' 3\")  Wt 74.8 kg (165 lb)  BMI 29.23 kg/m2  EFM: Category 1  [place]:  Ctx q1-3.5min  SVE:  2.5/90%/-2, stretchy cervix.   Membranes: clear fluid        A/P:  32 year old  @41w2d admitted for IOL for postdates.   1.  Routine cares  2.  Epidural per pt request.   3. Early phase of first stage of labor, good progress.       Aislinn Layton    "

## 2017-07-18 LAB
HGB BLD-MCNC: 9.7 G/DL (ref 11.7–15.7)
T PALLIDUM IGG+IGM SER QL: NEGATIVE

## 2017-07-18 PROCEDURE — 25000132 ZZH RX MED GY IP 250 OP 250 PS 637: Performed by: OBSTETRICS & GYNECOLOGY

## 2017-07-18 PROCEDURE — 12000037 ZZH R&B POSTPARTUM INTERMEDIATE

## 2017-07-18 PROCEDURE — 85018 HEMOGLOBIN: CPT | Performed by: OBSTETRICS & GYNECOLOGY

## 2017-07-18 PROCEDURE — 25000128 H RX IP 250 OP 636: Performed by: OBSTETRICS & GYNECOLOGY

## 2017-07-18 PROCEDURE — 25000128 H RX IP 250 OP 636: Performed by: REGISTERED NURSE

## 2017-07-18 PROCEDURE — 25000128 H RX IP 250 OP 636

## 2017-07-18 PROCEDURE — 36415 COLL VENOUS BLD VENIPUNCTURE: CPT | Performed by: OBSTETRICS & GYNECOLOGY

## 2017-07-18 RX ORDER — NALOXONE HYDROCHLORIDE 0.4 MG/ML
.1-.4 INJECTION, SOLUTION INTRAMUSCULAR; INTRAVENOUS; SUBCUTANEOUS
Status: DISCONTINUED | OUTPATIENT
Start: 2017-07-18 | End: 2017-07-19 | Stop reason: CLARIF

## 2017-07-18 RX ORDER — HYDROMORPHONE HYDROCHLORIDE 1 MG/ML
INJECTION, SOLUTION INTRAMUSCULAR; INTRAVENOUS; SUBCUTANEOUS
Status: COMPLETED
Start: 2017-07-18 | End: 2017-07-18

## 2017-07-18 RX ORDER — ACETAMINOPHEN 325 MG/1
TABLET ORAL
Status: DISCONTINUED
Start: 2017-07-18 | End: 2017-07-18 | Stop reason: HOSPADM

## 2017-07-18 RX ORDER — HYDROMORPHONE HYDROCHLORIDE 1 MG/ML
INJECTION, SOLUTION INTRAMUSCULAR; INTRAVENOUS; SUBCUTANEOUS
Status: DISCONTINUED
Start: 2017-07-18 | End: 2017-07-18 | Stop reason: HOSPADM

## 2017-07-18 RX ORDER — LIDOCAINE 40 MG/G
CREAM TOPICAL
Status: DISCONTINUED | OUTPATIENT
Start: 2017-07-18 | End: 2017-07-19 | Stop reason: CLARIF

## 2017-07-18 RX ORDER — EPHEDRINE SULFATE 50 MG/ML
5 INJECTION, SOLUTION INTRAMUSCULAR; INTRAVENOUS; SUBCUTANEOUS
Status: DISCONTINUED | OUTPATIENT
Start: 2017-07-18 | End: 2017-07-19 | Stop reason: CLARIF

## 2017-07-18 RX ORDER — NALBUPHINE HYDROCHLORIDE 10 MG/ML
2.5-5 INJECTION, SOLUTION INTRAMUSCULAR; INTRAVENOUS; SUBCUTANEOUS EVERY 6 HOURS PRN
Status: DISCONTINUED | OUTPATIENT
Start: 2017-07-18 | End: 2017-07-19 | Stop reason: CLARIF

## 2017-07-18 RX ADMIN — HYDROMORPHONE HYDROCHLORIDE 0.5 MG: 1 INJECTION, SOLUTION INTRAMUSCULAR; INTRAVENOUS; SUBCUTANEOUS at 00:04

## 2017-07-18 RX ADMIN — HYDROMORPHONE HYDROCHLORIDE 0.5 MG: 1 INJECTION, SOLUTION INTRAMUSCULAR; INTRAVENOUS; SUBCUTANEOUS at 00:31

## 2017-07-18 RX ADMIN — SENNOSIDES AND DOCUSATE SODIUM 1 TABLET: 8.6; 5 TABLET ORAL at 12:01

## 2017-07-18 RX ADMIN — KETOROLAC TROMETHAMINE 30 MG: 30 INJECTION, SOLUTION INTRAMUSCULAR at 05:56

## 2017-07-18 RX ADMIN — HYDROMORPHONE HYDROCHLORIDE 0.5 MG: 1 INJECTION, SOLUTION INTRAMUSCULAR; INTRAVENOUS; SUBCUTANEOUS at 01:09

## 2017-07-18 RX ADMIN — KETOROLAC TROMETHAMINE 30 MG: 30 INJECTION, SOLUTION INTRAMUSCULAR at 11:57

## 2017-07-18 RX ADMIN — SODIUM CHLORIDE, SODIUM LACTATE, POTASSIUM CHLORIDE, CALCIUM CHLORIDE AND DEXTROSE MONOHYDRATE: 5; 600; 310; 30; 20 INJECTION, SOLUTION INTRAVENOUS at 03:05

## 2017-07-18 RX ADMIN — ACETAMINOPHEN 975 MG: 325 TABLET, FILM COATED ORAL at 00:31

## 2017-07-18 RX ADMIN — HYDROMORPHONE HYDROCHLORIDE 0.5 MG: 1 INJECTION, SOLUTION INTRAMUSCULAR; INTRAVENOUS; SUBCUTANEOUS at 03:05

## 2017-07-18 RX ADMIN — HYDROMORPHONE HYDROCHLORIDE 0.5 MG: 1 INJECTION, SOLUTION INTRAMUSCULAR; INTRAVENOUS; SUBCUTANEOUS at 04:04

## 2017-07-18 RX ADMIN — OXYCODONE HYDROCHLORIDE 5 MG: 5 TABLET ORAL at 17:32

## 2017-07-18 RX ADMIN — ACETAMINOPHEN 975 MG: 325 TABLET, FILM COATED ORAL at 18:17

## 2017-07-18 RX ADMIN — ACETAMINOPHEN 975 MG: 325 TABLET, FILM COATED ORAL at 10:21

## 2017-07-18 RX ADMIN — SENNOSIDES AND DOCUSATE SODIUM 1 TABLET: 8.6; 5 TABLET ORAL at 18:17

## 2017-07-18 RX ADMIN — KETOROLAC TROMETHAMINE 30 MG: 30 INJECTION, SOLUTION INTRAMUSCULAR at 18:17

## 2017-07-18 RX ADMIN — OXYCODONE HYDROCHLORIDE 5 MG: 5 TABLET ORAL at 20:59

## 2017-07-18 RX ADMIN — OXYCODONE HYDROCHLORIDE 5 MG: 5 TABLET ORAL at 14:07

## 2017-07-18 NOTE — LACTATION NOTE
Initial Lactation visit. Hand out given. Recommend unlimited, frequent breast feedings: At least 8 - 12 times every 24 hours. Avoid pacifiers and supplementation with formula unless medically indicated. Explained benefits of holding baby skin on skin to help promote better breastfeeding outcomes. Infant latched and feeding well during visit.  Leti states her nipples have always been more sensitive.  States it isn't painful when baby is latched well.  Nipple was round when baby came off breast.  Reviewed normal process of lactation, milk coming in and signs infant is getting enough.  Encouraged Leti to call for latch checks with first few feedings to ensure baby is latching deep enough.  Has nipple cream she will start using after feedings.  Will revisit as needed.    Charu Cummings RN, IBCLC

## 2017-07-18 NOTE — ANESTHESIA POSTPROCEDURE EVALUATION
Patient: Leti Tapia    * No procedures listed *    Diagnosis:* No pre-op diagnosis entered *  Diagnosis Additional Information: No value filed.    Anesthesia Type:  No value filed.    Note:  Anesthesia Post Evaluation    Patient location during evaluation: Bedside  Patient participation: Able to fully participate in evaluation  Level of consciousness: awake  Pain management: adequate  Airway patency: patent  Cardiovascular status: acceptable  Respiratory status: acceptable  Hydration status: acceptable  PONV: controlled     Anesthetic complications: None          Last vitals:  Vitals:    07/18/17 1200 07/18/17 1209 07/18/17 1407   BP:  118/81    Pulse:      Resp:  16    Temp:  36.7  C (98.1  F)    SpO2: 98% 99% 98%         Electronically Signed By: Lianna Kidd MD  July 18, 2017  3:46 PM

## 2017-07-18 NOTE — PROGRESS NOTES
[facility]   Obstetrics Post-Op / Progress Note             Interval History:   Doing ok. Very tired.  Pain is well-controlled.  No fevers.  No history of wound drainage, warmth or significant erythema.    Denies chest pain, shortness of breath. Small emesis x 2 this AM. Pt very anxious to get up, shower.            Significant Problems:    None          Review of Systems:    The patient denies any chest pain, shortness of breath, excessive pain, fever, chills, purulent drainage from the wound          Medications:   All medications related to the patient's surgery have been reviewed          Physical Exam:     All vitals stable  Patient Vitals for the past 12 hrs:   BP Temp Temp src Pulse Heart Rate Resp SpO2   07/18/17 0805 113/74 97.6  F (36.4  C) Oral - 96 16 100 %   07/18/17 0510 113/62 97.9  F (36.6  C) Oral - 92 16 98 %   07/18/17 0401 103/62 - - - 56 16 98 %   07/18/17 0325 - - - - - 16 -   07/18/17 0305 114/73 - - - 95 16 99 %   07/18/17 0200 118/76 - - - 102 18 96 %   07/18/17 0126 109/66 - - - 108 18 97 %   07/18/17 0100 124/74 - - - 108 17 97 %   07/18/17 0045 134/59 - - - 104 14 94 %   07/18/17 0030 129/78 - - - 98 10 97 %   07/18/17 0015 128/82 - - - 111 15 96 %   07/18/17 0000 138/74 - - - 94 18 96 %   07/17/17 2345 116/83 - - - 102 10 98 %   07/17/17 2330 118/76 - - - 103 12 97 %   07/17/17 2320 110/74 99.1  F (37.3  C) Oral 107 - 16 -   07/17/17 2130 106/68 - - - - - 97 %   07/17/17 2100 100/54 - - - - - 95 %   07/17/17 2030 103/51 - - - - 16 96 %     dressing clean and dry with minimal or no drainage.  Surrounding skin with minimal erythema.  LE non tender. Pt has declined to wear pneumoboots. Encouraged when in bed          Data:     Hemoglobin   Date Value Ref Range Status   07/17/2017 11.5 (L) 11.7 - 15.7 g/dL Final   10/20/2014 13.4 11.7 - 15.7 g/dL Final     No imaging studies have been ordered         Assessment and Plan:    Assessment:   Post-operative day #1  Low transverse primary   section  L&D complications: Fetal intolerance of labor      Doing well.  No immediate surgical complications identified.  Pain well-controlled.      Plan:   Ambulation encouraged with assist   Pain control measures as needed. Reviewed transition to po meds.  OSCAR Lara out later today  Anticipate discharge in 2 days       Courtney Brenner MD

## 2017-07-18 NOTE — ANESTHESIA CARE TRANSFER NOTE
Patient: Leti Tapia    Procedure(s):   - Wound Class: II-Clean Contaminated    Diagnosis: pregnancy  Diagnosis Additional Information: No value filed.    Anesthesia Type:   Epidural     Note:  Airway :Room Air  Patient transferred to:PACU  Comments: Pt exhibits spontaneous respirations, all monitors and alarms on in PACU, VSS, patent IV, report and transfer of care to RN.        Vitals: (Last set prior to Anesthesia Care Transfer)    CRNA VITALS  7/17/2017 2248 - 7/17/2017 2348      7/17/2017             Resp Rate (set): 10                Electronically Signed By: BLAISE Lieberman CRNA  July 17, 2017  11:53 PM

## 2017-07-18 NOTE — PLAN OF CARE
- Aislinn Layton at bedside, SVE unchaged. Pt helped onto left side with peanut ball. Pt had N/V episode immediately after turning. Baby had 5 minute PD down to 90s during this time. Reviewed strip with Aislinn, plan to increase pitocin by 2 milliunits/min in 30 minutes if no variables. POC discussed with pt.   - Baby having intermittent early decels, no variables noted. Pitocin increased to 6 milliunits/min.   - Baby continues to have int. ED, one 4 min PD. Aislinn Layton on the unit and aware, pitocin turned off. Dr. Covarrubias coming in to review strip.   - Dr. Covarrubias at bedside to discuss plan of care with pt and . Baby continued to have decels, with contractions FHR would go down to the 90s and then loose capture. Monitor adjusted. Decision for  at . See OR flowsheets, delivery summary and MD notes for further details.

## 2017-07-18 NOTE — PLAN OF CARE
Problem: Goal Outcome Summary  Goal: Goal Outcome Summary  Outcome: No Change  Vitals remain stable. Pt very sleepy and exhausted. Complains of right sided incision pain, Dilaudid and Toradol given. Pt unable to get up at this morning, brand care provided and clean pad placed, pt is aware she will have to get up by 1000. D5 in LR infusing. Continuous pulse ox in place. Pt refused to continue pneumboots this morning due to them keeping her awake. Pt is still in and out and needs reminding about plan of care. Continue to monitor closely.

## 2017-07-18 NOTE — PLAN OF CARE
Problem: Goal Outcome Summary  Goal: Goal Outcome Summary  Outcome: Improving  Vitals stable.  Up to bathroom for pericare- tolerated well.  Sat in chair for short period.  IV saline locked.  Tolerating light diet- no nausea.   Lara draining concentrated urine.   breast feeding going very well.   Will continue to monitor.

## 2017-07-18 NOTE — BRIEF OP NOTE
[facility] Brief Operative Note    Pre-operative diagnosis: IUP at 41+2; fetal intolerance of labor   Post-operative diagnosis same   Procedure: Primary LST    Surgeon(s): Surgeon(s) and Role:     * Janae Covarrubias MD - Primary   Estimated blood loss: 600 mL    Specimens:   ID Type Source Tests Collected by Time Destination   1 :  Cord blood Umbilical Cord OR DOCUMENTATION ONLY Janae Covarrubias MD 2017 10:47 PM       Findings: Female infant; vtx presentation; 7# 50z; normal placenta; normal ovaries and FT    No complications

## 2017-07-18 NOTE — PROGRESS NOTES
Pt. admitted from L&D via bed. Pt. arrived with baby and was accompanied by spouse and arrived with personal belongings. Report was taken from LIV Bailey in L&D.  Pt. is A&Ox3 and VSS on RA. Fundus is firm and midline.  Vaginal bleeding is scant.   Pt. c/o 4/10 pain. Pt. denied  nausea, CP, SOB, lightheadedness, or dizziness. LS Clear  PIV patent and infusing.  Lara patent and draining.  Dressing to lower abdomen CDI.  Pneumoboots in place to BLE.  Pt. oriented to the room and call light system.

## 2017-07-18 NOTE — ANESTHESIA PREPROCEDURE EVALUATION
"Procedure: Procedure(s):   SECTION  Preop diagnosis: pregnancy    No Known Allergies  History reviewed. No pertinent past medical history.  Past Surgical History:   Procedure Laterality Date     MARSUPIALIZATION BARTHOLIN CYST Right 10/20/2014    Procedure: MARSUPIALIZATION BARTHOLIN CYST;  Surgeon: aJnae Covarrubias MD;  Location:  OR     Prior to Admission medications    Not on File     Current Facility-Administered Medications Ordered in Epic   Medication Dose Route Frequency Last Rate Last Dose     sodium chloride (PF) 0.9% PF flush 3 mL  3 mL Intracatheter Q8H   3 mL at 17 0820     lactated ringers infusion   Intravenous Continuous 125 mL/hr at 17 2027       lactated ringers BOLUS 500 mL  500 mL Intravenous Once PRN         lactated ringers BOLUS 1,000 mL  1,000 mL Intravenous Once PRN   1,000 mL at 17 1211    Or     lactated ringers BOLUS 500 mL  500 mL Intravenous Once PRN         acetaminophen (TYLENOL) tablet 650 mg  650 mg Oral Q4H PRN         naloxone (NARCAN) injection 0.1-0.4 mg  0.1-0.4 mg Intravenous Q2 Min PRN         ondansetron (ZOFRAN) injection 4 mg  4 mg Intravenous Q6H PRN         oxytocin (PITOCIN) injection 10 Units  10 Units Intramuscular Once PRN         oxytocin (PITOCIN) 30 units in 500 mL 0.9% NaCl infusion  100-340 mL/hr Intravenous Continuous PRN         Medication Instructions: misoprostol (CYTOTEC)- Nurse to discuss ordering with provider, if needed. Ordered via \"OB misoprostol (CYTOTEC) Postpartum Hemorrhage PANEL\"   Does not apply Continuous PRN         methylergonovine (METHERGINE) injection 200 mcg  200 mcg Intramuscular Once PRN         carboprost (HEMABATE) injection 250 mcg  250 mcg Intramuscular Once PRN         lidocaine 1 % 0.1-20 mL  0.1-20 mL Subcutaneous Once PRN         ibuprofen (ADVIL/MOTRIN) tablet 800 mg  800 mg Oral Once PRN         oxyCODONE-acetaminophen (PERCOCET) 5-325 MG per tablet 1 tablet  1 tablet Oral Once PRN    "      medication instruction   Does not apply Continuous PRN         Opioid plan postpartum - medication instruction   Does not apply Continuous PRN         fentaNYL (SUBLIMAZE) 2 mcg/mL, ropivacaine (NAROPIN) 0.2% in NaCl 0.9% EPIDURAL infusion   EPIDURAL Continuous 12 mL/hr at 07/17/17 2000       lactated ringers BOLUS 250 mL  250 mL Intravenous Once PRN         ePHEDrine injection 5 mg  5 mg Intravenous Q3 Min PRN         nalbuphine (NUBAIN) injection 2.5-5 mg  2.5-5 mg Intravenous Q6H PRN         naloxone (NARCAN) injection 0.1-0.4 mg  0.1-0.4 mg Intravenous Q2 Min PRN         medication instruction   Does not apply Continuous PRN         ondansetron (ZOFRAN-ODT) ODT tab 4 mg  4 mg Oral Q6H PRN        Or     ondansetron (ZOFRAN) injection 4 mg  4 mg Intravenous Q6H PRN         lidocaine 1 % 1 mL  1 mL Other Q1H PRN         lidocaine (LMX4) cream   Topical Q1H PRN         sodium chloride (PF) 0.9% PF flush 3 mL  3 mL Intracatheter Q1H PRN         sodium chloride (PF) 0.9% PF flush 3 mL  3 mL Intracatheter Q8H         oxytocin (PITOCIN) 30 units in 500 mL 0.9% NaCl infusion  1-24 homa-units/min Intravenous Continuous   Stopped at 07/17/17 2100     lactated ringers BOLUS 1,000 mL  1,000 mL Intravenous Once         lactated ringers infusion   Intravenous Continuous         ceFAZolin sodium-dextrose (ANCEF) infusion 2 g  2 g Intravenous Pre-Op/Pre-procedure x 1 dose         ceFAZolin (ANCEF) 1 g vial to attach to  ml bag for ADULT or 50 ml bag for PEDS  1 g Intravenous Q2H PRN         misoprostol (cervical ripening) (CYTOTEC) quarter-tab 25 mcg  25 mcg Oral Q2H PRN   25 mcg at 07/17/17 0440     zolpidem (AMBIEN) tablet 5 mg  5 mg Oral At Bedtime PRN   5 mg at 07/16/17 2231     fentaNYL (PF) (SUBLIMAZE) injection  mcg   mcg Intravenous Q1H PRN   100 mcg at 07/17/17 1147     naloxone (NARCAN) injection 0.1-0.4 mg  0.1-0.4 mg Intravenous Q2 Min PRN         No current Epic-ordered outpatient  prescriptions on file.     Wt Readings from Last 1 Encounters:   07/16/17 74.8 kg (165 lb)     Temp Readings from Last 1 Encounters:   07/17/17 37.5  C (99.5  F) (Temporal)     BP Readings from Last 6 Encounters:   07/17/17 100/54   04/25/16 122/51   10/21/14 102/57     Pulse Readings from Last 4 Encounters:   07/17/17 83   04/25/16 59     Resp Readings from Last 1 Encounters:   07/17/17 16     SpO2 Readings from Last 1 Encounters:   07/17/17 95%     Recent Labs   Lab Test  10/20/14   2202   NA  137   POTASSIUM  3.7   CHLORIDE  106   CO2  23   ANIONGAP  8   GLC  79   BUN  15   CR  0.98   DENISE  9.5     Recent Labs   Lab Test  10/20/14   2202   WBC  13.0*   HGB  13.4   PLT  249     Recent Labs   Lab Test  10/20/14   2202   INR  1.01      RECENT LABS:   ECG:   ECHO:   CXR:    Anesthesia Evaluation     . Pt has had prior anesthetic.     No history of anesthetic complications          ROS/MED HX    ENT/Pulmonary:     (+)tobacco use, Past use , . .    Neurologic:       Cardiovascular:         METS/Exercise Tolerance:     Hematologic:         Musculoskeletal:         GI/Hepatic:         Renal/Genitourinary:         Endo:         Psychiatric:         Infectious Disease:         Malignancy:         Other:                     Physical Exam  Normal systems: cardiovascular, pulmonary and dental    Airway   Mallampati: I  Neck ROM: full    Dental     Cardiovascular       Pulmonary                     Anesthesia Plan      History & Physical Review      ASA Status:  2 emergent.    NPO Status:  > 8 hours    Plan for Epidural   PONV prophylaxis:  Ondansetron (or other 5HT-3)  Epidural for labor converted to epidural for c section      Postoperative Care  Postoperative pain management:  IV analgesics and Neuraxial analgesia.      Consents  Anesthetic plan, risks, benefits and alternatives discussed with:  Patient..                          .

## 2017-07-19 PROCEDURE — 25000132 ZZH RX MED GY IP 250 OP 250 PS 637: Performed by: REGISTERED NURSE

## 2017-07-19 PROCEDURE — 12000037 ZZH R&B POSTPARTUM INTERMEDIATE

## 2017-07-19 PROCEDURE — 25000128 H RX IP 250 OP 636: Performed by: OBSTETRICS & GYNECOLOGY

## 2017-07-19 PROCEDURE — 25000132 ZZH RX MED GY IP 250 OP 250 PS 637: Performed by: OBSTETRICS & GYNECOLOGY

## 2017-07-19 RX ORDER — IBUPROFEN 600 MG/1
600 TABLET, FILM COATED ORAL EVERY 6 HOURS PRN
Status: DISCONTINUED | OUTPATIENT
Start: 2017-07-19 | End: 2017-07-20 | Stop reason: HOSPADM

## 2017-07-19 RX ADMIN — SIMETHICONE CHEW TAB 80 MG 80 MG: 80 TABLET ORAL at 08:54

## 2017-07-19 RX ADMIN — ACETAMINOPHEN 975 MG: 325 TABLET, FILM COATED ORAL at 08:42

## 2017-07-19 RX ADMIN — OXYCODONE HYDROCHLORIDE 5 MG: 5 TABLET ORAL at 08:42

## 2017-07-19 RX ADMIN — IBUPROFEN 600 MG: 600 TABLET ORAL at 20:19

## 2017-07-19 RX ADMIN — ACETAMINOPHEN 975 MG: 325 TABLET, FILM COATED ORAL at 17:20

## 2017-07-19 RX ADMIN — OXYCODONE HYDROCHLORIDE 5 MG: 5 TABLET ORAL at 14:30

## 2017-07-19 RX ADMIN — IBUPROFEN 600 MG: 600 TABLET ORAL at 14:30

## 2017-07-19 RX ADMIN — OXYCODONE HYDROCHLORIDE 5 MG: 5 TABLET ORAL at 11:23

## 2017-07-19 RX ADMIN — KETOROLAC TROMETHAMINE 30 MG: 30 INJECTION, SOLUTION INTRAMUSCULAR at 00:33

## 2017-07-19 RX ADMIN — ACETAMINOPHEN 975 MG: 325 TABLET, FILM COATED ORAL at 00:35

## 2017-07-19 RX ADMIN — SENNOSIDES AND DOCUSATE SODIUM 1 TABLET: 8.6; 5 TABLET ORAL at 20:18

## 2017-07-19 RX ADMIN — SENNOSIDES AND DOCUSATE SODIUM 1 TABLET: 8.6; 5 TABLET ORAL at 08:42

## 2017-07-19 RX ADMIN — IBUPROFEN 600 MG: 600 TABLET ORAL at 08:42

## 2017-07-19 RX ADMIN — OXYCODONE HYDROCHLORIDE 5 MG: 5 TABLET ORAL at 17:20

## 2017-07-19 RX ADMIN — OXYCODONE HYDROCHLORIDE 5 MG: 5 TABLET ORAL at 00:35

## 2017-07-19 RX ADMIN — OXYCODONE HYDROCHLORIDE 5 MG: 5 TABLET ORAL at 20:19

## 2017-07-19 RX ADMIN — OXYCODONE HYDROCHLORIDE 5 MG: 5 TABLET ORAL at 05:30

## 2017-07-19 RX ADMIN — OXYCODONE HYDROCHLORIDE 5 MG: 5 TABLET ORAL at 23:23

## 2017-07-19 NOTE — PLAN OF CARE
Problem: Goal Outcome Summary  Goal: Goal Outcome Summary  Outcome: Improving  Feels well. Vitals stable.  Oral pain medications working well. Dressing removed, staples intact. IV removed.  Showered.Regular diet tolerated- Encouraged to ambulate in halls.  Breast feeding going well- nipples tender- using own nipple cream.  Will continue to monitor.

## 2017-07-19 NOTE — PROGRESS NOTES
"OB Post-op  Section Progress Note POD# 2    S:  Patient doing well.  Pain is  well controlled with oral pain medication.  Ambulating.  Tolerating reg diet.  No N/V.  Passing flatus.  Voiding.  Bleeding normal per pt report.  Breastfeeding well.     O:  /74  Pulse 107  Temp 98.7  F (37.1  C) (Oral)  Resp 16  Ht 1.6 m (5' 3\")  Wt 74.8 kg (165 lb)  SpO2 98%  Breastfeeding? Unknown  BMI 29.23 kg/m2    Gen- A&O, NAD  Abd- soft, non-tender, +BS, no rebound or guarding, fundus firm at umbilicus  Incision- C/D/I  Ext- non-tender, +1 edema. Pneumoboots in place lower extremities    Hemoglobin   Date Value Ref Range Status   2017 9.7 (L) 11.7 - 15.7 g/dL Final     O +  Rubella imm    A/P:  32 year old  POD# 2 s/p primary LTCS for fetal intolerance of labor.  1.  Routine post-op cares  2.  Anticipate d/c home on POD#3    Aislinn Layton  2017  7:43 AM   "

## 2017-07-19 NOTE — PLAN OF CARE
Problem: Goal Outcome Summary  Goal: Goal Outcome Summary  Outcome: No Change  Vitals stable. Pt is having good pain control with oxycodone and tylenol. Using abdomen binder and ice packs as needed for added pain relief. Pt is still very tired, started sleeping better tonight. Bonding well with baby. Breastfeeding independently. Voiding w/o difficulty. Pt's plan is to shower today and continue to practice infant cares with help from nursing staff as needed. Continue with plan of care.

## 2017-07-19 NOTE — PLAN OF CARE
Problem: Goal Outcome Summary  Goal: Goal Outcome Summary  Outcome: Improving  Vitals stable, voiding/stooling appropriately. Fundus firm, midline, no free flow or clots. Incision c/d/i. Pain well controlled with oxycodone, toradol, and tylenol. Will continue to monitor.

## 2017-07-20 VITALS
BODY MASS INDEX: 29.23 KG/M2 | HEIGHT: 63 IN | DIASTOLIC BLOOD PRESSURE: 73 MMHG | RESPIRATION RATE: 16 BRPM | SYSTOLIC BLOOD PRESSURE: 116 MMHG | TEMPERATURE: 98.4 F | HEART RATE: 92 BPM | OXYGEN SATURATION: 98 % | WEIGHT: 165 LBS

## 2017-07-20 PROCEDURE — 25000132 ZZH RX MED GY IP 250 OP 250 PS 637: Performed by: REGISTERED NURSE

## 2017-07-20 PROCEDURE — 25000132 ZZH RX MED GY IP 250 OP 250 PS 637: Performed by: OBSTETRICS & GYNECOLOGY

## 2017-07-20 RX ORDER — OXYCODONE HYDROCHLORIDE 5 MG/1
5-10 TABLET ORAL EVERY 4 HOURS PRN
Qty: 30 TABLET | Refills: 0 | Status: SHIPPED | OUTPATIENT
Start: 2017-07-20 | End: 2019-11-18

## 2017-07-20 RX ORDER — IBUPROFEN 600 MG/1
600 TABLET, FILM COATED ORAL EVERY 6 HOURS PRN
Qty: 120 TABLET | Refills: 1 | Status: SHIPPED | OUTPATIENT
Start: 2017-07-20 | End: 2019-11-18

## 2017-07-20 RX ADMIN — OXYCODONE HYDROCHLORIDE 5 MG: 5 TABLET ORAL at 07:18

## 2017-07-20 RX ADMIN — OXYCODONE HYDROCHLORIDE 5 MG: 5 TABLET ORAL at 11:20

## 2017-07-20 RX ADMIN — ACETAMINOPHEN 975 MG: 325 TABLET, FILM COATED ORAL at 01:01

## 2017-07-20 RX ADMIN — SENNOSIDES AND DOCUSATE SODIUM 1 TABLET: 8.6; 5 TABLET ORAL at 07:18

## 2017-07-20 RX ADMIN — IBUPROFEN 600 MG: 600 TABLET ORAL at 07:18

## 2017-07-20 RX ADMIN — ACETAMINOPHEN 975 MG: 325 TABLET, FILM COATED ORAL at 09:13

## 2017-07-20 NOTE — PLAN OF CARE
Problem: Goal Outcome Summary  Goal: Goal Outcome Summary  Outcome: Adequate for Discharge Date Met:  07/20/17  Vitals stable. Up and about.  Oral pain medications working well.  Incision had a small amount of old drainage- washed off.  Staples removed- steristrips applied.  Breast feeding going well- nipples tender- using shells and lanolin.  Ready for discharge home with baby.

## 2017-07-20 NOTE — PROGRESS NOTES
"OB CS post op note  POD# 3     S:  Patient doing well.  Pain controlled with oxycodone and ibuprofen.  Froylan reg diet,  Voiding,  Bleeding light.  Breast feeding well. Passing flatus, BM x 2. Denies HA, SOB, or dizziness. Very happy with birth.    O:  /73  Pulse 98  Temp 98.5  F (36.9  C) (Oral)  Resp 16  Ht 1.6 m (5' 3\")  Wt 74.8 kg (165 lb)  SpO2 98%  Breastfeeding BMI 29.23 kg/m2  No intake or output data in the 24 hours ending 17 0729   Gen- A&O, NAD  PULM: CTAB  CV: RRR  Abd- Non-tender, fundus firm at umbilicus  Incision: Intact well approximated, no redness or drainage  Ext- non-tender, neg edema    Hemoglobin   Date Value Ref Range Status   2017 9.7 (L) 11.7 - 15.7 g/dL Final     O+  Rubella Immune    A/P: 32 year old  POD# 3 s/p primaryLTCS for fetal intolerance to labor, breastfeeding, asymptomatic anemia    1.  Post partum/post op teaching reviewed  2.  Unsure of BCM  3.  Rx for oxycodone, ibuprofen  4.  Iron 325mg po BID, colace 100mg po BID  5. D/C home today, RTC in 6 weeks/prn    Nohemy Augustine  2017  7:29 AM    "

## 2017-07-20 NOTE — PLAN OF CARE
Problem: Goal Outcome Summary  Goal: Goal Outcome Summary  Outcome: No Change  VSS. States adequate rest and pain control. Breast feeding independently every 2 to 3 hours. Baby rooming in. Plan for d/c in am.

## 2017-07-20 NOTE — PLAN OF CARE
Problem: Goal Outcome Summary  Goal: Goal Outcome Summary  Outcome: Improving  Vital signs are stable.  Breastfeeding is going well.  Oral pain medications working well.  Ambulated  in halls this evening.  Will continue to monitor.

## 2017-07-21 NOTE — OP NOTE
DATE OF SERVICE: 2017    DATE OF PROCEDURE: 2017    ATTENDING PHYSICIAN:  Janae Covarrubias MD.    PREOPERATIVE DIAGNOSES:    1.  Intrauterine pregnancy at 41 weeks and 2 days.  2.  Fetal intolerance of labor.    SCHEDULED PROCEDURE:  Primary low segment transverse  section.    HISTORY OF PRESENT ILLNESS:  The patient is a 32-year-old  1, para 0 who had been followed by the certified nurse midwife service at [facility] throughout her pregnancy.  The patient had been admitted on the evening of 2017 for induction of labor due to post date states.    As noted above, she was followed by the nurse midwife.  The pregnancy was complicated by travel to [place] with possible Zika exposure prior to conception and negative Zika testing in the first trimester.  The remainder of the pregnancy was uncomplicated.      The patient was admitted for Cytotec cervical ripening on the evening of 2017.  On initial evaluation, the patient had a _____ fetal heart tracing, stable maternal vital signs and received 5 doses of p.o. Cytotec.  On the morning of 2017, she was evaluated by Pam Samson the midwife.  At that time, she was dori regularly and underwent artificial rupture of membranes with clear fluid noted and was 300% and minus 2.  The patient continued to contract regularly and receiving an epidural for pain.  By 4:00 p.m., the patient had no cervical change, so Pitocin augmentation was begun.     I was called at approximately 9:00 p.m. by Pam to come in and evaluate the fetal heart tracing due to concerns of a prolonged deceleration followed by some repetitive late decelerations.     When I arrived at the bedside, the Pitocin had been discontinued.  The patient had maternal oxygen.  Evaluation of the tracing showed it to be a baseline of 120s with average variability.  Approximately 1 hour prior to my arrival, there has been a prolonged deceleration approximately 4  minutes in length time to 90.  However, after my arrival the patient began to have repetitive late decelerations.  After a lengthy discussion with the patient and review of entire tracing, we discussed that the patient is currently remote from delivery that over the past 2 hours they have been unable to restart the Pitocin due to the fetal status and then my recommendation at this time was delivery by  section.    The patient and  agree and consent is signed.    ASSESSMENT:    1.  Intrauterine pregnancy at 41 weeks and 2 days.  2.  Fetal intolerance of labor after induction of labor for post date status.    PLAN:  Will be proceeding with primary low segment transverse  section.        Janae Covarrubias MD    D:  2017 00:40 T:  2017 07:41  Document:  [id number] AS\JH

## 2017-07-21 NOTE — H&P
DATE OF SERVICE: 2017    DATE OF PROCEDURE: 2017    ATTENDING PHYSICIAN:  Janae Covarrubias MD.    PREOPERATIVE DIAGNOSES:    1.  Intrauterine pregnancy at 41 weeks and 2 days.  2.  Fetal intolerance of labor.    SCHEDULED PROCEDURE:  Primary low segment transverse  section.    HISTORY OF PRESENT ILLNESS:  The patient is a 32-year-old  1, para 0 who had been followed by the certified nurse midwife service at [facility] throughout her pregnancy.  The patient had been admitted on the evening of 2017 for induction of labor due to post date states.    As noted above, she was followed by the nurse midwife.  The pregnancy was complicated by travel to [place] with possible Zika exposure prior to conception and negative Zika testing in the first trimester.  The remainder of the pregnancy was uncomplicated.      The patient was admitted for Cytotec cervical ripening on the evening of 2017.  On initial evaluation, the patient had a _____ fetal heart tracing, stable maternal vital signs and received 5 doses of p.o. Cytotec.  On the morning of 2017, she was evaluated by Pam Samson the midwife.  At that time, she was dori regularly and underwent artificial rupture of membranes with clear fluid noted and was 300% and minus 2.  The patient continued to contract regularly and receiving an epidural for pain.  By 4:00 p.m., the patient had no cervical change, so Pitocin augmentation was begun.     I was called at approximately 9:00 p.m. by Pam to come in and evaluate the fetal heart tracing due to concerns of a prolonged deceleration followed by some repetitive late decelerations.     When I arrived at the bedside, the Pitocin had been discontinued.  The patient had maternal oxygen.  Evaluation of the tracing showed it to be a baseline of 120s with average variability.  Approximately 1 hour prior to my arrival, there has been a prolonged deceleration approximately 4  minutes in length time to 90.  However, after my arrival the patient began to have repetitive late decelerations.  After a lengthy discussion with the patient and review of entire tracing, we discussed that the patient is currently remote from delivery that over the past 2 hours they have been unable to restart the Pitocin due to the fetal status and then my recommendation at this time was delivery by  section.    The patient and  agree and consent is signed.    ASSESSMENT:    1.  Intrauterine pregnancy at 41 weeks and 2 days.  2.  Fetal intolerance of labor after induction of labor for post date status.    PLAN:  Will be proceeding with primary low segment transverse  section.        Janae Covarrubias MD    D:  2017 00:40 T:  2017 07:41  Document:  [id number] AS\JH

## 2017-07-21 NOTE — OP NOTE
Surgeon / Clinician: Janae Covarrubias MD    PREOPERATIVE DIAGNOSES:    1.  Intrauterine pregnancy at 41 plus 2 weeks.    2.  Fetal intolerance of labor.    POSTOPERATIVE DIAGNOSIS:    1.  Intrauterine pregnancy at 41 plus 2 weeks.    2.  Fetal intolerance of labor.    PROCEDURE:  Primary low segment transverse  section.    ANESTHESIA:  Epidural.     ESTIMATED BLOOD LOSS:  600 mL.    SURGEON:  Janae Covarrubias MD    ASSISTANT:  Calin    FINDINGS:  Female infant in the vertex presentation with clear amniotic fluid.  Weight 7 pounds 5 ounces.   Normal appearing placenta.  Normal appearing ovaries and fallopian tubes.  Apgars of 9 at 1 minute, 9 at 5 minutes.    COMPLICATIONS:  None.     PROCEDURE IN DETAIL:  The patient was taken to the operating room where epidural was re-dosed and found to be adequate.  She had a Lara catheter in place that was draining clear urine.  She was prepped and draped in the normal sterile fashion and timeout was performed.    Pfannenstiel skin incision was made 2 cm above the symphysis pubis and carried down through the subcutaneous layer to the fascia.  The fascia was incised in the midline with the scalpel and the fascial incision was extended bilaterally using Zhang scissors.  Kocher clamps were placed on superior aspect of the fascia, which was elevated, and the rectus muscles were dissected off bluntly and sharply.  Kocher clamps were replaced on the inferior aspect of the fascia, which was elevating, rectus muscles were dissected off sharply and bluntly.  Rectus muscles were then  bluntly in the midline.  Peritoneum was identified, grasped with a Enterprise, entered sharply with Metzenbaum scissors and the peritoneal incision was extended superiorly and inferiorly with excellent visualization of the bladder.  At this time, bladder blade was placed.  ___ and peritoneum was identified, grasped with pickups and entered sharply with Metzenbaum scissors.  This incision  was extended bilaterally and a bladder flap was created digitally.  Bladder blade was replaced.  At this time, a scalpel was used to make a transverse incision and a well  ___ lower uterine segment down to the uterine cavity.  A small amount of clear fluid was noted.  This incision was extended bilaterally using bandage scissors.  At this time, the fetal vertex was delivered through the uterine incision, shoulders were delivered, and the remainder of the infant was delivered.  Delayed cord clamping was done.    Infant was handed off to the waiting pediatric team.      Findings included a female with a weight of 7 pounds 5 ounces, and Apgars of 9 and 9.      At this time, the placenta was removed with traction and fundal massage, as well as manual assistance.  A moist laparotomy sponge was used to clear the uterus of all clot and debris.  At this time, close examination of the uterine incision showed no extension.  The bladder blade was replaced.  Uterine incision was closed using 0 Vicryl in a running locked fashion with single layer of 0 Monocryl in an embrocating fashion.  At this time, the pericolic gutters were copiously irrigated.  The bladder flap was examined closely and excellent hemostasis noted.  Uterine incision was hemostatic.  At this time, peritoneum was closed using 2-0 Vicryl in a running fashion.  Rectus muscles were noted to be hemostatic. Seprafilm was placed over the peritoneum and rectus muscles.  Fascia was closed using 0 Vicryl in a running fashion.  Subcutaneous tissue was irrigated.  Hemostasis was assured using cautery and skin was closed with staples.      After completion of the procedure, sponge, lap, and needle counts were correct x2.  The patient was taken to recovery in stable condition.        Janae Covarrubias MD    D:  07/18/2017 00:43 T:  07/21/2017 08:23  Document:  [id number] AS\MM

## 2017-09-25 NOTE — DISCHARGE SUMMARY
OB  Discharge Summary    DOA: 2017  DOD:  2017    Admission Diagnosis  1.  IUP @ 41w2d   2.  Induction of labor for post dates    Discharge Diagnosis  1.  IUP @ 41w2d   2.  S/p primary LST C/S      HPI / Hospital Course:     Patient is a 32 year old  41w2d who presented to L&D for induction of labor due to post dates .  Her prenatal course was unremarkable. Pt developed fetal intolerance of labor and was consented for .    Intra-operative course:  Patient was taken to the operating room for the above noted procedure. No complications.  For full details of intraoperative course, please see dictated operative note.      Post-operative course:   Patient's post-operative course was uncomplicated.  By post-operative day #3 she was ambulating, her pain was well-controlled on oral pain medications, her bleeding was minimal, she was tolerating PO, she was voiding and passing flatus.  Patient was deemed stable for discharge.  Her post-op Hgb was   Lab Results   Component Value Date    HGB 9.7 2017    HGB 11.5 2017   .  Her vital signs remained stable  On the day of discharge, her wound was without evidence of infection.  Her staples were removed and steri-strips were applied.      Post-op instructions:  1.  Patient was instructed to RTC in 6 weeks for post-partum visit.  2.  Patient was instructed to call MD for temperature greater than 100.4, foul smelling vaginal discharge, bleeding >1pad per hour, severe pain not controlled by pain medications, severe HA, redness/drainage from incision, asymmetric LE edema or with other concerns.  3.  Post-partum mood symptoms discussed.  Pt will call with si/sx of depression.  4.  Patient instructed to avoid lifting >20# x 6 weeks, to avoid vigorous exercise x 6 weeks, to observe pelvic rest x 6 weeks (no tampons or IC) and to avoid driving while taking narcotics.  5.  Patient instructed to remove steri-strips after 1 week.       Post-op  medications:  1.  Ibuprofen 600mg PO Q6hrs PRN pain  2.  Oxycodone 5mg PO Q4hrs PRN severe pain  3.  Colace or other stool softener as needed  4.  PNV  5.  Resume home medications    Janae Covarrubias  9/25/2017  9:28 AM

## 2019-06-01 ENCOUNTER — HOSPITAL ENCOUNTER (EMERGENCY)
Facility: CLINIC | Age: 34
Discharge: HOME | End: 2019-06-01
Attending: EMERGENCY MEDICINE | Admitting: EMERGENCY MEDICINE
Payer: COMMERCIAL

## 2019-06-01 VITALS
BODY MASS INDEX: 24.22 KG/M2 | DIASTOLIC BLOOD PRESSURE: 64 MMHG | OXYGEN SATURATION: 98 % | HEIGHT: 63 IN | SYSTOLIC BLOOD PRESSURE: 118 MMHG | WEIGHT: 136.69 LBS | HEART RATE: 97 BPM | RESPIRATION RATE: 18 BRPM | TEMPERATURE: 97 F

## 2019-06-01 DIAGNOSIS — R10.12 ABDOMINAL PAIN, LEFT UPPER QUADRANT: ICD-10-CM

## 2019-06-01 DIAGNOSIS — R11.10 VOMITING AND DIARRHEA: ICD-10-CM

## 2019-06-01 DIAGNOSIS — R19.7 VOMITING AND DIARRHEA: ICD-10-CM

## 2019-06-01 LAB
ALBUMIN SERPL-MCNC: 5 G/DL (ref 3.4–5)
ALBUMIN UR-MCNC: 50 MG/DL
ALP SERPL-CCNC: 92 U/L (ref 40–150)
ALT SERPL W P-5'-P-CCNC: 28 U/L (ref 0–50)
ANION GAP SERPL CALCULATED.3IONS-SCNC: 10 MMOL/L (ref 3–14)
APPEARANCE UR: CLEAR
AST SERPL W P-5'-P-CCNC: 29 U/L (ref 0–45)
BACTERIA #/AREA URNS HPF: ABNORMAL /HPF
BASOPHILS # BLD AUTO: 0.1 10E9/L (ref 0–0.2)
BASOPHILS NFR BLD AUTO: 0.5 %
BILIRUB SERPL-MCNC: 0.5 MG/DL (ref 0.2–1.3)
BILIRUB UR QL STRIP: NEGATIVE
BUN SERPL-MCNC: 19 MG/DL (ref 7–30)
CALCIUM SERPL-MCNC: 9.6 MG/DL (ref 8.5–10.1)
CHLORIDE SERPL-SCNC: 105 MMOL/L (ref 94–109)
CO2 SERPL-SCNC: 21 MMOL/L (ref 20–32)
COLOR UR AUTO: YELLOW
CREAT SERPL-MCNC: 1.05 MG/DL (ref 0.52–1.04)
DIFFERENTIAL METHOD BLD: ABNORMAL
EOSINOPHIL # BLD AUTO: 0.1 10E9/L (ref 0–0.7)
EOSINOPHIL NFR BLD AUTO: 0.6 %
ERYTHROCYTE [DISTWIDTH] IN BLOOD BY AUTOMATED COUNT: 12.4 % (ref 10–15)
GFR SERPL CREATININE-BSD FRML MDRD: 69 ML/MIN/{1.73_M2}
GLUCOSE SERPL-MCNC: 160 MG/DL (ref 70–99)
GLUCOSE UR STRIP-MCNC: NEGATIVE MG/DL
HCG UR QL: NEGATIVE
HCT VFR BLD AUTO: 46.2 % (ref 35–47)
HGB BLD-MCNC: 16 G/DL (ref 11.7–15.7)
HGB UR QL STRIP: NEGATIVE
HYALINE CASTS #/AREA URNS LPF: 8 /LPF (ref 0–2)
IMM GRANULOCYTES # BLD: 0.1 10E9/L (ref 0–0.4)
IMM GRANULOCYTES NFR BLD: 0.5 %
KETONES UR STRIP-MCNC: NEGATIVE MG/DL
LEUKOCYTE ESTERASE UR QL STRIP: NEGATIVE
LIPASE SERPL-CCNC: 268 U/L (ref 73–393)
LYMPHOCYTES # BLD AUTO: 0.6 10E9/L (ref 0.8–5.3)
LYMPHOCYTES NFR BLD AUTO: 3.6 %
MCH RBC QN AUTO: 30.9 PG (ref 26.5–33)
MCHC RBC AUTO-ENTMCNC: 34.6 G/DL (ref 31.5–36.5)
MCV RBC AUTO: 89 FL (ref 78–100)
MONOCYTES # BLD AUTO: 0.7 10E9/L (ref 0–1.3)
MONOCYTES NFR BLD AUTO: 3.9 %
MUCOUS THREADS #/AREA URNS LPF: PRESENT /LPF
NEUTROPHILS # BLD AUTO: 15.8 10E9/L (ref 1.6–8.3)
NEUTROPHILS NFR BLD AUTO: 90.9 %
NITRATE UR QL: NEGATIVE
NRBC # BLD AUTO: 0 10*3/UL
NRBC BLD AUTO-RTO: 0 /100
PH UR STRIP: 6 PH (ref 5–7)
PLATELET # BLD AUTO: 295 10E9/L (ref 150–450)
POTASSIUM SERPL-SCNC: 4.2 MMOL/L (ref 3.4–5.3)
PROT SERPL-MCNC: 9.6 G/DL (ref 6.8–8.8)
RBC # BLD AUTO: 5.17 10E12/L (ref 3.8–5.2)
RBC #/AREA URNS AUTO: 1 /HPF (ref 0–2)
SODIUM SERPL-SCNC: 136 MMOL/L (ref 133–144)
SOURCE: ABNORMAL
SP GR UR STRIP: 1.03 (ref 1–1.03)
SQUAMOUS #/AREA URNS AUTO: 2 /HPF (ref 0–1)
UROBILINOGEN UR STRIP-MCNC: NORMAL MG/DL (ref 0–2)
WBC # BLD AUTO: 17.4 10E9/L (ref 4–11)
WBC #/AREA URNS AUTO: 2 /HPF (ref 0–5)

## 2019-06-01 PROCEDURE — 25000132 ZZH RX MED GY IP 250 OP 250 PS 637: Performed by: EMERGENCY MEDICINE

## 2019-06-01 PROCEDURE — 96361 HYDRATE IV INFUSION ADD-ON: CPT

## 2019-06-01 PROCEDURE — 25000128 H RX IP 250 OP 636: Performed by: EMERGENCY MEDICINE

## 2019-06-01 PROCEDURE — 83690 ASSAY OF LIPASE: CPT | Performed by: EMERGENCY MEDICINE

## 2019-06-01 PROCEDURE — 80053 COMPREHEN METABOLIC PANEL: CPT | Performed by: EMERGENCY MEDICINE

## 2019-06-01 PROCEDURE — 99284 EMERGENCY DEPT VISIT MOD MDM: CPT | Mod: 25

## 2019-06-01 PROCEDURE — 81025 URINE PREGNANCY TEST: CPT | Performed by: EMERGENCY MEDICINE

## 2019-06-01 PROCEDURE — 85025 COMPLETE CBC W/AUTO DIFF WBC: CPT | Performed by: EMERGENCY MEDICINE

## 2019-06-01 PROCEDURE — 25800030 ZZH RX IP 258 OP 636: Performed by: EMERGENCY MEDICINE

## 2019-06-01 PROCEDURE — 96375 TX/PRO/DX INJ NEW DRUG ADDON: CPT

## 2019-06-01 PROCEDURE — 96374 THER/PROPH/DIAG INJ IV PUSH: CPT

## 2019-06-01 PROCEDURE — 81001 URINALYSIS AUTO W/SCOPE: CPT | Performed by: EMERGENCY MEDICINE

## 2019-06-01 PROCEDURE — 25000125 ZZHC RX 250: Performed by: EMERGENCY MEDICINE

## 2019-06-01 RX ORDER — ONDANSETRON 4 MG/1
4 TABLET, ORALLY DISINTEGRATING ORAL EVERY 8 HOURS PRN
Qty: 10 TABLET | Refills: 0 | Status: SHIPPED | OUTPATIENT
Start: 2019-06-01 | End: 2019-11-18

## 2019-06-01 RX ORDER — DICYCLOMINE HCL 20 MG
20 TABLET ORAL 2 TIMES DAILY PRN
Qty: 10 TABLET | Refills: 0 | Status: SHIPPED | OUTPATIENT
Start: 2019-06-01 | End: 2019-11-18

## 2019-06-01 RX ORDER — ONDANSETRON 2 MG/ML
4 INJECTION INTRAMUSCULAR; INTRAVENOUS ONCE
Status: COMPLETED | OUTPATIENT
Start: 2019-06-01 | End: 2019-06-01

## 2019-06-01 RX ORDER — DICYCLOMINE HCL 20 MG
20 TABLET ORAL ONCE
Status: COMPLETED | OUTPATIENT
Start: 2019-06-01 | End: 2019-06-01

## 2019-06-01 RX ADMIN — ONDANSETRON 4 MG: 2 INJECTION INTRAMUSCULAR; INTRAVENOUS at 09:18

## 2019-06-01 RX ADMIN — LIDOCAINE HYDROCHLORIDE 30 ML: 20 SOLUTION ORAL; TOPICAL at 10:03

## 2019-06-01 RX ADMIN — SODIUM CHLORIDE, POTASSIUM CHLORIDE, SODIUM LACTATE AND CALCIUM CHLORIDE 1000 ML: 600; 310; 30; 20 INJECTION, SOLUTION INTRAVENOUS at 09:21

## 2019-06-01 RX ADMIN — FAMOTIDINE 20 MG: 10 INJECTION INTRAVENOUS at 09:18

## 2019-06-01 RX ADMIN — DICYCLOMINE HYDROCHLORIDE 20 MG: 20 TABLET ORAL at 09:18

## 2019-06-01 ASSESSMENT — ENCOUNTER SYMPTOMS
VOMITING: 1
ABDOMINAL PAIN: 1
NAUSEA: 1
DIARRHEA: 1

## 2019-06-01 ASSESSMENT — MIFFLIN-ST. JEOR: SCORE: 1294.13

## 2019-06-01 NOTE — ED AVS SNAPSHOT
[facility] Department  [address]  [facility] [address]  Phone:  [phone]  Fax:  [phone]                                    Leti Tapia   MRN: [id number]    Department:  [facility] Emergency Department   Date of Visit:  6/1/2019           After Visit Summary Signature Page    I have received my discharge instructions, and my questions have been answered. I have discussed any challenges I see with this plan with the nurse or doctor.    ..........................................................................................................................................  Patient/Patient Representative Signature      ..........................................................................................................................................  Patient Representative Print Name and Relationship to Patient    ..................................................               ................................................  Date                                   Time    ..........................................................................................................................................  Reviewed by Signature/Title    ...................................................              ..............................................  Date                                               Time          22EPIC Rev 08/18

## 2019-06-01 NOTE — ED PROVIDER NOTES
"  History     Chief Complaint:  Abdominal Pain and Nausea, Vomiting, & Diarrhea      HPI   Leti Tapia is a 33 year old female who presents with abdominal pain, nausea, vomiting, and diarrhea that began last night. She notes she has had about 25 episodes of vomiting and around 10 episodes of diarrhea. She denies being sick recently or being around sick people. She describes her pain as a \"twisty\" pain around her ribs. She has had a  section, but no other abdominal surgeries.     Allergies:  No Known Drug Allergies      Medications:    The patient is not currently taking any prescribed medications.      Past Medical History:    No past medical history on file.    Past Surgical History:     section  Bartholin cyst    Family History:    No family history on file.    Social History:  The patient was accompanied to the ED by her .  Smoking Status: former  Smokeless Tobacco: no  Alcohol Use: no   Marital Status:  Single [1]     Review of Systems   Gastrointestinal: Positive for abdominal pain, diarrhea, nausea and vomiting.   All other systems reviewed and are negative.      Physical Exam     Patient Vitals for the past 24 hrs:   BP Temp Temp src Pulse Heart Rate Resp SpO2 Height Weight   19 1000 98/60 -- -- -- -- -- -- -- --   19 0955 98/60 -- -- 82 -- -- -- -- --   19 0950 98/60 -- -- -- 95 18 99 % -- --   19 0844 116/79 97  F (36.1  C) Temporal -- 115 18 98 % 1.6 m (5' 3\") 62 kg (136 lb 11 oz)        Physical Exam  Constitutional: vitals reviewed  HENT: Dry oral mucosa  Eyes: Grossly normal vision. Pupils are equal and round. Extraocular movements intact.  Sclera clear and without icterus.   Cardiovascular: Rapid rate. Regular rhythm. S1 and S2 without audible murmurs. 2+ radial pulses. Normal capillary refill.  Respiratory: Effort normal.   Gastrointestinal: Soft. No distension. No tenderness to palpation. No rebound or guarding.  Neurologic: Alert and awake. " Coordinated movement of extremities. Speech normal.  Skin: No diaphoresis, rashes, ecchymoses, or lesions.  Musculoskeletal: No lower extremity edema. No gross deformity. No joint swelling.  Psychiatric: Appropriate affect. Behavior is normal. Intact recent and remote memory. Linear thought process.     Emergency Department Course   Laboratory:  CBC: WNL (WBC 17.4, HGB 16.0, )   CMP: Glucose 160, protein total 9.6 H (Creatinine 1.05)   Lipase: 268  UA with micro: Protein Albumin 50H, Bacteria Few, Squamous epithelial 2 H, Mucus urine present, Hyaline casts 8 H o/w negative   HCG Qualitative: negative     Interventions:  Medications   famotidine (PEPCID) injection 20 mg (20 mg Intravenous Given 6/1/19 0918)   ondansetron (ZOFRAN) injection 4 mg (4 mg Intravenous Given 6/1/19 0918)   lactated ringers BOLUS 1,000 mL (1,000 mLs Intravenous New Bag 6/1/19 0921)   dicyclomine (BENTYL) tablet 20 mg (20 mg Oral Given 6/1/19 0918)   lidocaine HCl (XYLOCAINE) 2 % 15 mL, alum & mag hydroxide-simethicone (MYLANTA ES/MAALOX  ES) 15 mL GI Cocktail (30 mLs Oral Given 6/1/19 1003)         Emergency Department Course:  Past medical records, nursing notes, and vitals reviewed.  0901: I performed an exam of the patient and obtained history, as documented above.   IV inserted and blood drawn.   The patient provided a urine sample here in the emergency department. This was sent for laboratory testing, findings above.   1140: I rechecked the patient. Findings and plan explained to the Patient. Patient discharged home with instructions regarding supportive care, medications, and reasons to return. The importance of close follow-up was reviewed.         Impression & Plan      Medical Decision Making:  Patient who presents with acute onset abdominal pain, vomiting, diarrhea this morning.  She is having some symptoms and signs of dehydration, so a IV was started and labs were drawn.  She was given IV fluids, IV antiemetics, oral  antispasmodics, oral gastritis treatments and had improvement in her symptoms.  She is subsequently able to tolerate oral intake.  Repeat abdominal exam with no concerning findings.  I see no indication for abdominal imaging at this time.  I suspect her leukocytosis is from this acute process which is most likely gastroenteritis versus gastritis.  She will be discharged home with prescriptions for Zofran, Bentyl, Zantac.  Return precautions for worsening symptoms.  Follow-up with primary care in 3 days if symptoms are not improving.  She left the emergency department in improved condition.    Diagnosis:    ICD-10-CM    1. Abdominal pain, left upper quadrant R10.12    2. Vomiting and diarrhea R11.10     R19.7        Disposition:  discharged to home    Discharge Medications:     Medication List      Started    dicyclomine 20 MG tablet  Commonly known as:  BENTYL  20 mg, Oral, 2 TIMES DAILY PRN     ondansetron 4 MG ODT tab  Commonly known as:  ZOFRAN ODT  4 mg, Oral, EVERY 8 HOURS PRN     ranitidine 150 MG tablet  Commonly known as:  ZANTAC  150 mg, Oral, 2 TIMES DAILY              Leandra Oden  6/1/2019   [facility] EMERGENCY DEPARTMENT  Scribe Disclosure:  I, Leandra Oden, am serving as a scribe at 9:01 AM on 6/1/2019 to document services personally performed by Zack Cesar MD based on my observations and the provider's statements to me.       Zack Cesar MD  06/01/19 4507

## 2019-06-01 NOTE — ED NOTES
Pt is able to take sips of water without vomiting.  She also reports less cramping and feels like she wants to go home.  MD updated.

## 2019-11-18 ENCOUNTER — OFFICE VISIT (OUTPATIENT)
Dept: FAMILY MEDICINE | Facility: CLINIC | Age: 34
End: 2019-11-18
Payer: COMMERCIAL

## 2019-11-18 VITALS
WEIGHT: 149 LBS | TEMPERATURE: 99.7 F | BODY MASS INDEX: 26.39 KG/M2 | OXYGEN SATURATION: 94 % | HEART RATE: 78 BPM | DIASTOLIC BLOOD PRESSURE: 71 MMHG | SYSTOLIC BLOOD PRESSURE: 113 MMHG

## 2019-11-18 DIAGNOSIS — S09.91XA TRAUMA OF EAR CANAL, INITIAL ENCOUNTER: Primary | ICD-10-CM

## 2019-11-18 PROCEDURE — 99203 OFFICE O/P NEW LOW 30 MIN: CPT | Performed by: NURSE PRACTITIONER

## 2019-11-18 NOTE — PROGRESS NOTES
Subjective     Leti Tapia is a 34 year old female who presents to clinic today for the following health issues:    HPI   Ear Problem      Duration: sine this morning after cleaning her ears with a qtip she started having bleeding from the right ear.  She has had no pain , prior or after the cleaning, but the bleeding has persisted so she has been using qtips to clean out little clots all day.     Description (location/character/radiation): right ear    Intensity:  mild    Accompanying signs and symptoms: feels like water in there, has cont'd to bleed throughout the day    History (similar episodes/previous evaluation): pt got out of shower, cleaned ears and a couple hours later felt blood coming out of her ear    Precipitating or alleviating factors: None    Therapies tried and outcome: None     Patient Active Problem List   Diagnosis     Vulvar hematoma     Post-op pain     Allergic rhinitis, unspecified allergic rhinitis type     Indication for care in labor or delivery     S/P      Past Surgical History:   Procedure Laterality Date      SECTION N/A 2017    Procedure:  SECTION;;  Surgeon: Janae Covarrubias MD;  Location:  L+D     MARSUPIALIZATION BARTHOLIN CYST Right 10/20/2014    Procedure: MARSUPIALIZATION BARTHOLIN CYST;  Surgeon: Janae Covarrubias MD;  Location:  OR       Social History     Tobacco Use     Smoking status: Former Smoker     Packs/day: 0.50     Years: 15.00     Pack years: 7.50     Last attempt to quit: 2017     Years since quittin.8     Smokeless tobacco: Never Used   Substance Use Topics     Alcohol use: No     History reviewed. No pertinent family history.      No current outpatient medications on file.     No Known Allergies    Reviewed and updated as needed this visit by Provider         Review of Systems   ROS COMP: Constitutional, HEENT, cardiovascular, pulmonary, gi and gu systems are negative, except  as otherwise noted.      Objective      /71 (BP Location: Right arm, Cuff Size: Adult Regular)   Pulse 78   Temp 99.7  F (37.6  C) (Tympanic)   Wt 67.6 kg (149 lb)   SpO2 94%   BMI 26.39 kg/m      Physical Exam   GENERAL: healthy, alert and no distress  EYES: Eyes grossly normal to inspection, PERRL and conjunctivae and sclerae normal  HENT: right ear canal has a small scratch right at the opening anterior wall, remainder canal looks intact and bilateral TMS are normal,, nose and mouth without ulcers or lesions  NECK: no adenopathy, no asymmetry, masses, or scars and thyroid normal to palpation      Diagnostic Test Results:  Labs reviewed in Epic        Assessment & Plan       ICD-10-CM    1. Trauma of ear canal, initial encounter S09.91XA    after cleansing and clearing with sterile water and qtip a small cotton ball place at canal introitus and advised to keep in place over night. Rinse ears in the shower only.  Advised against use  Of q tips for ear cleaning           BLAISE Oseguera [facility]

## 2020-03-10 ENCOUNTER — HEALTH MAINTENANCE LETTER (OUTPATIENT)
Age: 35
End: 2020-03-10

## 2020-12-20 ENCOUNTER — HEALTH MAINTENANCE LETTER (OUTPATIENT)
Age: 35
End: 2020-12-20

## 2021-04-24 ENCOUNTER — HEALTH MAINTENANCE LETTER (OUTPATIENT)
Age: 36
End: 2021-04-24

## 2021-10-03 ENCOUNTER — HEALTH MAINTENANCE LETTER (OUTPATIENT)
Age: 36
End: 2021-10-03

## 2022-05-15 ENCOUNTER — HEALTH MAINTENANCE LETTER (OUTPATIENT)
Age: 37
End: 2022-05-15

## 2022-05-20 ENCOUNTER — MEDICAL CORRESPONDENCE (OUTPATIENT)
Dept: HEALTH INFORMATION MANAGEMENT | Facility: CLINIC | Age: 37
End: 2022-05-20
Payer: COMMERCIAL

## 2022-05-20 ENCOUNTER — TRANSFERRED RECORDS (OUTPATIENT)
Dept: HEALTH INFORMATION MANAGEMENT | Facility: CLINIC | Age: 37
End: 2022-05-20
Payer: COMMERCIAL

## 2022-05-23 ENCOUNTER — DOCUMENTATION ONLY (OUTPATIENT)
Dept: ONCOLOGY | Facility: CLINIC | Age: 37
End: 2022-05-23
Payer: COMMERCIAL

## 2022-05-23 ENCOUNTER — PATIENT OUTREACH (OUTPATIENT)
Dept: ONCOLOGY | Facility: CLINIC | Age: 37
End: 2022-05-23
Payer: COMMERCIAL

## 2022-05-23 ENCOUNTER — TRANSCRIBE ORDERS (OUTPATIENT)
Dept: OTHER | Age: 37
End: 2022-05-23
Payer: COMMERCIAL

## 2022-05-23 DIAGNOSIS — Z80.3 FAMILY HISTORY OF BREAST CANCER: Primary | ICD-10-CM

## 2022-05-23 NOTE — PROGRESS NOTES
Action May 23, 2022 4:09 PM ABT   Action Taken Records from Mel[facility] TAYLOR received and sent to HIM for upload

## 2022-09-10 ENCOUNTER — HEALTH MAINTENANCE LETTER (OUTPATIENT)
Age: 37
End: 2022-09-10

## 2023-07-23 ENCOUNTER — HEALTH MAINTENANCE LETTER (OUTPATIENT)
Age: 38
End: 2023-07-23

## 2024-07-19 ENCOUNTER — LAB REQUISITION (OUTPATIENT)
Dept: LAB | Facility: CLINIC | Age: 39
End: 2024-07-19
Payer: COMMERCIAL

## 2024-07-19 DIAGNOSIS — Z12.4 ENCOUNTER FOR SCREENING FOR MALIGNANT NEOPLASM OF CERVIX: ICD-10-CM

## 2024-07-19 PROCEDURE — G0145 SCR C/V CYTO,THINLAYER,RESCR: HCPCS | Performed by: NURSE PRACTITIONER

## 2024-07-19 PROCEDURE — 87624 HPV HI-RISK TYP POOLED RSLT: CPT | Performed by: NURSE PRACTITIONER

## 2024-07-22 LAB
HPV HR 12 DNA CVX QL NAA+PROBE: NEGATIVE
HPV16 DNA CVX QL NAA+PROBE: NEGATIVE
HPV18 DNA CVX QL NAA+PROBE: NEGATIVE
HUMAN PAPILLOMA VIRUS FINAL DIAGNOSIS: NORMAL

## 2024-07-25 LAB
BKR LAB AP GYN ADEQUACY: NORMAL
BKR LAB AP GYN INTERPRETATION: NORMAL
BKR LAB AP LMP: NORMAL
BKR LAB AP PREVIOUS ABNL DX: NORMAL
BKR LAB AP PREVIOUS ABNORMAL: NORMAL
PATH REPORT.COMMENTS IMP SPEC: NORMAL
PATH REPORT.COMMENTS IMP SPEC: NORMAL
PATH REPORT.RELEVANT HX SPEC: NORMAL

## 2024-09-15 ENCOUNTER — HEALTH MAINTENANCE LETTER (OUTPATIENT)
Age: 39
End: 2024-09-15

## (undated) DEVICE — SOL NACL 0.9% IRRIG 1000ML BOTTLE 07138-09

## (undated) DEVICE — PACK C-SECTION LF PL15OTA83B

## (undated) DEVICE — SU VICRYL 0 CT-1 36" J346H

## (undated) DEVICE — CATH TRAY FOLEY 16FR BARDEX W/DRAIN BAG STATLOCK 300316A

## (undated) DEVICE — SU VICRYL 2-0 CT-1 27" J339H

## (undated) DEVICE — BNDG COBAN 4"X5YDS STERILE

## (undated) DEVICE — STPL SKIN 35W APPOSE 8886803712

## (undated) DEVICE — SUCTION CANISTER MEDIVAC LINER 3000ML W/LID 65651-530

## (undated) DEVICE — ESU GROUND PAD UNIVERSAL W/O CORD

## (undated) DEVICE — PREP CHLORAPREP 26ML TINTED ORANGE  260815

## (undated) DEVICE — SU MONOCRYL 0 CT-1 36" UND Y946H

## (undated) DEVICE — GLOVE PROTEXIS POWDER FREE 6.5 ORTHOPEDIC 2D73ET65

## (undated) DEVICE — BLADE CLIPPER 4406

## (undated) DEVICE — BARRIER SEPRAFILM 5X6" SINGLE SHEET 4301-02

## (undated) DEVICE — LINEN C-SECTION 5415

## (undated) DEVICE — GLOVE PROTEXIS MICRO 6.5  2D73PM65

## (undated) DEVICE — SU VICRYL 0 CT-1 27" J340H